# Patient Record
Sex: MALE | Race: WHITE | Employment: FULL TIME | URBAN - METROPOLITAN AREA
[De-identification: names, ages, dates, MRNs, and addresses within clinical notes are randomized per-mention and may not be internally consistent; named-entity substitution may affect disease eponyms.]

---

## 2017-10-27 ENCOUNTER — HOSPITAL ENCOUNTER (OUTPATIENT)
Facility: CLINIC | Age: 47
Setting detail: OBSERVATION
Discharge: HOME | End: 2017-10-28
Attending: EMERGENCY MEDICINE | Admitting: INTERNAL MEDICINE
Payer: COMMERCIAL

## 2017-10-27 ENCOUNTER — APPOINTMENT (OUTPATIENT)
Dept: CT IMAGING | Facility: CLINIC | Age: 47
End: 2017-10-27
Attending: EMERGENCY MEDICINE
Payer: COMMERCIAL

## 2017-10-27 ENCOUNTER — APPOINTMENT (OUTPATIENT)
Dept: GENERAL RADIOLOGY | Facility: CLINIC | Age: 47
End: 2017-10-27
Attending: EMERGENCY MEDICINE
Payer: COMMERCIAL

## 2017-10-27 DIAGNOSIS — J18.9 PNEUMONIA OF RIGHT LOWER LOBE DUE TO INFECTIOUS ORGANISM: ICD-10-CM

## 2017-10-27 DIAGNOSIS — K59.03 DRUG-INDUCED CONSTIPATION: Primary | ICD-10-CM

## 2017-10-27 DIAGNOSIS — S01.01XA LACERATION OF SCALP, INITIAL ENCOUNTER: ICD-10-CM

## 2017-10-27 DIAGNOSIS — S22.41XA CLOSED FRACTURE OF MULTIPLE RIBS OF RIGHT SIDE, INITIAL ENCOUNTER: ICD-10-CM

## 2017-10-27 LAB
ALBUMIN SERPL-MCNC: 3.3 G/DL (ref 3.4–5)
ALP SERPL-CCNC: 107 U/L (ref 40–150)
ALT SERPL W P-5'-P-CCNC: 18 U/L (ref 0–70)
ANION GAP SERPL CALCULATED.3IONS-SCNC: 5 MMOL/L (ref 3–14)
AST SERPL W P-5'-P-CCNC: 13 U/L (ref 0–45)
BASOPHILS # BLD AUTO: 0.1 10E9/L (ref 0–0.2)
BASOPHILS NFR BLD AUTO: 0.5 %
BILIRUB SERPL-MCNC: 0.5 MG/DL (ref 0.2–1.3)
BUN SERPL-MCNC: 13 MG/DL (ref 7–30)
CALCIUM SERPL-MCNC: 8.7 MG/DL (ref 8.5–10.1)
CHLORIDE SERPL-SCNC: 98 MMOL/L (ref 94–109)
CO2 BLDCOV-SCNC: 32 MMOL/L (ref 21–28)
CO2 SERPL-SCNC: 32 MMOL/L (ref 20–32)
CREAT SERPL-MCNC: 0.59 MG/DL (ref 0.66–1.25)
DIFFERENTIAL METHOD BLD: ABNORMAL
EOSINOPHIL # BLD AUTO: 0.1 10E9/L (ref 0–0.7)
EOSINOPHIL NFR BLD AUTO: 0.7 %
ERYTHROCYTE [DISTWIDTH] IN BLOOD BY AUTOMATED COUNT: 11.9 % (ref 10–15)
ETHANOL SERPL-MCNC: 0.18 G/DL
GFR SERPL CREATININE-BSD FRML MDRD: >90 ML/MIN/1.7M2
GLUCOSE SERPL-MCNC: 99 MG/DL (ref 70–99)
HCT VFR BLD AUTO: 41.7 % (ref 40–53)
HGB BLD-MCNC: 14.2 G/DL (ref 13.3–17.7)
IMM GRANULOCYTES # BLD: 0.1 10E9/L (ref 0–0.4)
IMM GRANULOCYTES NFR BLD: 0.9 %
LACTATE BLD-SCNC: 0.8 MMOL/L (ref 0.7–2)
LACTATE BLD-SCNC: 2.2 MMOL/L (ref 0.7–2.1)
LYMPHOCYTES # BLD AUTO: 2 10E9/L (ref 0.8–5.3)
LYMPHOCYTES NFR BLD AUTO: 13.2 %
MCH RBC QN AUTO: 36 PG (ref 26.5–33)
MCHC RBC AUTO-ENTMCNC: 34.1 G/DL (ref 31.5–36.5)
MCV RBC AUTO: 106 FL (ref 78–100)
MONOCYTES # BLD AUTO: 1.1 10E9/L (ref 0–1.3)
MONOCYTES NFR BLD AUTO: 7.5 %
NEUTROPHILS # BLD AUTO: 11.7 10E9/L (ref 1.6–8.3)
NEUTROPHILS NFR BLD AUTO: 77.2 %
NRBC # BLD AUTO: 0 10*3/UL
NRBC BLD AUTO-RTO: 0 /100
PCO2 BLDV: 49 MM HG (ref 40–50)
PH BLDV: 7.42 PH (ref 7.32–7.43)
PLATELET # BLD AUTO: 305 10E9/L (ref 150–450)
PO2 BLDV: 22 MM HG (ref 25–47)
POTASSIUM SERPL-SCNC: 4 MMOL/L (ref 3.4–5.3)
PROT SERPL-MCNC: 8.1 G/DL (ref 6.8–8.8)
RBC # BLD AUTO: 3.94 10E12/L (ref 4.4–5.9)
SAO2 % BLDV FROM PO2: 37 %
SODIUM SERPL-SCNC: 135 MMOL/L (ref 133–144)
WBC # BLD AUTO: 15.2 10E9/L (ref 4–11)

## 2017-10-27 PROCEDURE — G0378 HOSPITAL OBSERVATION PER HR: HCPCS

## 2017-10-27 PROCEDURE — 96365 THER/PROPH/DIAG IV INF INIT: CPT

## 2017-10-27 PROCEDURE — 25000132 ZZH RX MED GY IP 250 OP 250 PS 637: Performed by: INTERNAL MEDICINE

## 2017-10-27 PROCEDURE — 87040 BLOOD CULTURE FOR BACTERIA: CPT | Performed by: EMERGENCY MEDICINE

## 2017-10-27 PROCEDURE — 99285 EMERGENCY DEPT VISIT HI MDM: CPT | Mod: 25

## 2017-10-27 PROCEDURE — 71010 XR CHEST 1 VW: CPT

## 2017-10-27 PROCEDURE — 83605 ASSAY OF LACTIC ACID: CPT | Mod: 91 | Performed by: INTERNAL MEDICINE

## 2017-10-27 PROCEDURE — 25000132 ZZH RX MED GY IP 250 OP 250 PS 637: Performed by: EMERGENCY MEDICINE

## 2017-10-27 PROCEDURE — 25000128 H RX IP 250 OP 636: Performed by: EMERGENCY MEDICINE

## 2017-10-27 PROCEDURE — 71260 CT THORAX DX C+: CPT

## 2017-10-27 PROCEDURE — 96367 TX/PROPH/DG ADDL SEQ IV INF: CPT

## 2017-10-27 PROCEDURE — 80053 COMPREHEN METABOLIC PANEL: CPT | Performed by: EMERGENCY MEDICINE

## 2017-10-27 PROCEDURE — 99207 ZZC CDG-MDM COMPONENT: MEETS LOW - DOWN CODED: CPT | Performed by: INTERNAL MEDICINE

## 2017-10-27 PROCEDURE — 83605 ASSAY OF LACTIC ACID: CPT

## 2017-10-27 PROCEDURE — 82803 BLOOD GASES ANY COMBINATION: CPT

## 2017-10-27 PROCEDURE — 85025 COMPLETE CBC W/AUTO DIFF WBC: CPT | Performed by: EMERGENCY MEDICINE

## 2017-10-27 PROCEDURE — 36415 COLL VENOUS BLD VENIPUNCTURE: CPT

## 2017-10-27 PROCEDURE — 36415 COLL VENOUS BLD VENIPUNCTURE: CPT | Performed by: INTERNAL MEDICINE

## 2017-10-27 PROCEDURE — 80320 DRUG SCREEN QUANTALCOHOLS: CPT | Performed by: EMERGENCY MEDICINE

## 2017-10-27 PROCEDURE — 99219 ZZC INITIAL OBSERVATION CARE,LEVL II: CPT | Performed by: INTERNAL MEDICINE

## 2017-10-27 PROCEDURE — 99219 ZZC INITIAL OBSERVATION CARE,LEVL II: CPT | Performed by: SURGERY

## 2017-10-27 PROCEDURE — 70450 CT HEAD/BRAIN W/O DYE: CPT

## 2017-10-27 RX ORDER — SODIUM CHLORIDE 9 MG/ML
1000 INJECTION, SOLUTION INTRAVENOUS CONTINUOUS
Status: DISCONTINUED | OUTPATIENT
Start: 2017-10-27 | End: 2017-10-28 | Stop reason: HOSPADM

## 2017-10-27 RX ORDER — ONDANSETRON 2 MG/ML
4 INJECTION INTRAMUSCULAR; INTRAVENOUS EVERY 6 HOURS PRN
Status: DISCONTINUED | OUTPATIENT
Start: 2017-10-27 | End: 2017-10-28 | Stop reason: HOSPADM

## 2017-10-27 RX ORDER — NICOTINE 21 MG/24HR
1 PATCH, TRANSDERMAL 24 HOURS TRANSDERMAL DAILY
Status: DISCONTINUED | OUTPATIENT
Start: 2017-10-27 | End: 2017-10-28 | Stop reason: HOSPADM

## 2017-10-27 RX ORDER — MORPHINE SULFATE 4 MG/ML
4 INJECTION, SOLUTION INTRAMUSCULAR; INTRAVENOUS
Status: DISCONTINUED | OUTPATIENT
Start: 2017-10-27 | End: 2017-10-27

## 2017-10-27 RX ORDER — OXYCODONE HYDROCHLORIDE 5 MG/1
5-10 TABLET ORAL
Status: DISCONTINUED | OUTPATIENT
Start: 2017-10-27 | End: 2017-10-28 | Stop reason: HOSPADM

## 2017-10-27 RX ORDER — AMOXICILLIN 250 MG
1-2 CAPSULE ORAL 2 TIMES DAILY PRN
Status: DISCONTINUED | OUTPATIENT
Start: 2017-10-27 | End: 2017-10-28 | Stop reason: HOSPADM

## 2017-10-27 RX ORDER — LORAZEPAM 1 MG/1
1-2 TABLET ORAL EVERY 30 MIN PRN
Status: DISCONTINUED | OUTPATIENT
Start: 2017-10-27 | End: 2017-10-28 | Stop reason: HOSPADM

## 2017-10-27 RX ORDER — IBUPROFEN 600 MG/1
600 TABLET, FILM COATED ORAL EVERY 6 HOURS PRN
Status: DISCONTINUED | OUTPATIENT
Start: 2017-10-27 | End: 2017-10-28 | Stop reason: HOSPADM

## 2017-10-27 RX ORDER — AZITHROMYCIN 250 MG/1
250 TABLET, FILM COATED ORAL DAILY
Status: DISCONTINUED | OUTPATIENT
Start: 2017-10-28 | End: 2017-10-28 | Stop reason: HOSPADM

## 2017-10-27 RX ORDER — CEFTRIAXONE SODIUM 1 G/50ML
1 INJECTION, SOLUTION INTRAVENOUS EVERY 24 HOURS
Status: DISCONTINUED | OUTPATIENT
Start: 2017-10-28 | End: 2017-10-28

## 2017-10-27 RX ORDER — MULTIPLE VITAMINS W/ MINERALS TAB 9MG-400MCG
1 TAB ORAL DAILY
Status: DISCONTINUED | OUTPATIENT
Start: 2017-10-27 | End: 2017-10-28 | Stop reason: HOSPADM

## 2017-10-27 RX ORDER — IOPAMIDOL 755 MG/ML
500 INJECTION, SOLUTION INTRAVASCULAR ONCE
Status: COMPLETED | OUTPATIENT
Start: 2017-10-27 | End: 2017-10-27

## 2017-10-27 RX ORDER — LORAZEPAM 2 MG/ML
1-2 INJECTION INTRAMUSCULAR EVERY 30 MIN PRN
Status: DISCONTINUED | OUTPATIENT
Start: 2017-10-27 | End: 2017-10-28 | Stop reason: HOSPADM

## 2017-10-27 RX ORDER — NALOXONE HYDROCHLORIDE 0.4 MG/ML
.1-.4 INJECTION, SOLUTION INTRAMUSCULAR; INTRAVENOUS; SUBCUTANEOUS
Status: DISCONTINUED | OUTPATIENT
Start: 2017-10-27 | End: 2017-10-28 | Stop reason: HOSPADM

## 2017-10-27 RX ORDER — FOLIC ACID 1 MG/1
1 TABLET ORAL DAILY
Status: DISCONTINUED | OUTPATIENT
Start: 2017-10-27 | End: 2017-10-28 | Stop reason: HOSPADM

## 2017-10-27 RX ORDER — ONDANSETRON 4 MG/1
4 TABLET, ORALLY DISINTEGRATING ORAL EVERY 6 HOURS PRN
Status: DISCONTINUED | OUTPATIENT
Start: 2017-10-27 | End: 2017-10-28 | Stop reason: HOSPADM

## 2017-10-27 RX ORDER — ACETAMINOPHEN 500 MG
500 TABLET ORAL EVERY 4 HOURS PRN
Status: DISCONTINUED | OUTPATIENT
Start: 2017-10-27 | End: 2017-10-28 | Stop reason: HOSPADM

## 2017-10-27 RX ORDER — CEFTRIAXONE 1 G/1
1 INJECTION, POWDER, FOR SOLUTION INTRAMUSCULAR; INTRAVENOUS ONCE
Status: COMPLETED | OUTPATIENT
Start: 2017-10-27 | End: 2017-10-27

## 2017-10-27 RX ORDER — LANOLIN ALCOHOL/MO/W.PET/CERES
100 CREAM (GRAM) TOPICAL DAILY
Status: DISCONTINUED | OUTPATIENT
Start: 2017-10-27 | End: 2017-10-28 | Stop reason: HOSPADM

## 2017-10-27 RX ADMIN — Medication 100 MG: at 18:35

## 2017-10-27 RX ADMIN — SODIUM CHLORIDE 1000 ML: 9 INJECTION, SOLUTION INTRAVENOUS at 15:39

## 2017-10-27 RX ADMIN — NICOTINE 1 PATCH: 21 PATCH, EXTENDED RELEASE TRANSDERMAL at 18:35

## 2017-10-27 RX ADMIN — IOPAMIDOL 80 ML: 755 INJECTION, SOLUTION INTRAVENOUS at 14:33

## 2017-10-27 RX ADMIN — IBUPROFEN 600 MG: 600 TABLET ORAL at 20:44

## 2017-10-27 RX ADMIN — Medication 1 MG: at 20:44

## 2017-10-27 RX ADMIN — AZITHROMYCIN MONOHYDRATE 500 MG: 500 INJECTION, POWDER, LYOPHILIZED, FOR SOLUTION INTRAVENOUS at 16:17

## 2017-10-27 RX ADMIN — CEFTRIAXONE 1 G: 1 INJECTION, POWDER, FOR SOLUTION INTRAMUSCULAR; INTRAVENOUS at 15:37

## 2017-10-27 RX ADMIN — ACETAMINOPHEN 500 MG: 500 TABLET, FILM COATED ORAL at 14:18

## 2017-10-27 RX ADMIN — SODIUM CHLORIDE 1000 ML: 9 INJECTION, SOLUTION INTRAVENOUS at 16:18

## 2017-10-27 RX ADMIN — MULTIPLE VITAMINS W/ MINERALS TAB 1 TABLET: TAB at 18:35

## 2017-10-27 RX ADMIN — SODIUM CHLORIDE 60 ML: 9 INJECTION, SOLUTION INTRAVENOUS at 14:33

## 2017-10-27 RX ADMIN — FOLIC ACID 1 MG: 1 TABLET ORAL at 18:35

## 2017-10-27 ASSESSMENT — ENCOUNTER SYMPTOMS
ABDOMINAL PAIN: 0
WOUND: 1
COUGH: 0
NAUSEA: 0
BACK PAIN: 1
PHOTOPHOBIA: 0
NECK STIFFNESS: 0
SHORTNESS OF BREATH: 1
COLOR CHANGE: 0
DIFFICULTY URINATING: 0
HEADACHES: 1
DYSURIA: 0
FEVER: 0
PALPITATIONS: 0
NECK PAIN: 0
DIARRHEA: 0
NUMBNESS: 0
SPEECH DIFFICULTY: 0
MYALGIAS: 0
LIGHT-HEADEDNESS: 0
WEAKNESS: 0
ARTHRALGIAS: 0
VOMITING: 0
DIZZINESS: 0
FLANK PAIN: 1

## 2017-10-27 NOTE — IP AVS SNAPSHOT
MRN:[id number]                      After Visit Summary   10/27/2017    Luis Carlos Nash    MRN: [id number]           Thank you!     Thank you for choosing [facility] for your care. Our goal is always to provide you with excellent care. Hearing back from our patients is one way we can continue to improve our services. Please take a few minutes to complete the written survey that you may receive in the mail after you visit. If you would like to speak to someone directly about your visit please contact Patient Relations at [phone]. Thank you!          Patient Information     Date Of Birth          1970        Designated Caregiver       Most Recent Value    Caregiver    Will someone help with your care after discharge? yes    Name of designated caregiver Hector Nash    Phone number of caregiver [phone]      About your hospital stay     You were admitted on:  October 27, 2017 You last received care in the:  [place] [address] [facility]    You were discharged on:  October 28, 2017        Reason for your hospital stay       You were admitted for a fall with rib fractures (6 days prior to admission) found to have a pneumonia.  You have been treated with antibiotics.  Moving forward, you should continue to practice deep breathing and use the incentive spirometry machine.                  Who to Call     For medical emergencies, please call 911.  For non-urgent questions about your medical care, please call your primary care provider or clinic, [phone]          Attending Provider     Provider Specialty    Esperanza Payan MD Emergency Medicine    [facility]       Primary Care Provider Office Phone # Fax #    [facility] [phone]      After Care Instructions     Activity       Your activity upon discharge: gradually resume light activity as tolerated but do not drive if you have  "been drinking alcohol.            Diet       Follow this diet upon discharge: Orders Placed This Encounter      Regular Diet Adult                  Follow-up Appointments     Follow-up and recommended labs and tests        Follow up with primary care provider, [facility], within 5-7 days for hospital follow- up.                  Pending Results     Date and Time Order Name Status Description    10/27/2017 1533 Blood culture Preliminary     10/27/2017 1533 Blood culture Preliminary             Statement of Approval     Ordered          10/28/17 1142  I have reviewed and agree with all the recommendations and orders detailed in this document.  EFFECTIVE NOW     Approved and electronically signed by:  Nishant Garcia MD             Admission Information     Date & Time Provider Department Dept. Phone    10/27/2017 Nishant Garica MD [place] [address] [facility] [phone]      Your Vitals Were     Blood Pressure Pulse Temperature Respirations Height Weight    137/86 (BP Location: Right arm) 76 97.6  F (36.4  C) (Oral) 16 1.778 m (5' 10\") 73.9 kg (162 lb 14.4 oz)    Pulse Oximetry BMI (Body Mass Index)                95% 23.37 kg/m2          [facility] Information     [employer] lets you send messages to your doctor, view your test results, renew your prescriptions, schedule appointments and more. To sign up, go to [email or web address] . Click on \"Log in\" on the left side of the screen, which will take you to the Welcome page. Then click on \"Sign up Now\" on the right side of the page.     You will be asked to enter the access code listed below, as well as some personal information. Please follow the directions to create your username and password.     Your access code is: [email or web address]  Expires: 2018 12:49 PM     Your access code will  in 90 days. If you need help or a new code, please call your [facility] or [phone].        Care EveryWhere ID  "    This is your Care EveryWhere ID. This could be used by other organizations to access your [facility] medical records  [id number]        Equal Access to Services     JAZZ FLORES : Patricia Snyder, adrien ríos, claudette duncanmagume millan, alphonso grimesin hayaamurphy dunneronald silva lalaurenmurphy soniGavino So [facility] [phone].    ATENCIÓN: Si habla español, tiene a flores disposición servicios gratuitos de asistencia lingüística. Llame al [phone].    We comply with applicable federal civil rights laws and [place] laws. We do not discriminate on the basis of race, color, national origin, age, disability, sex, sexual orientation, or gender identity.               Review of your medicines      START taking        Dose / Directions    acetaminophen 500 MG tablet   Commonly known as:  TYLENOL   Used for:  Closed fracture of multiple ribs of right side, initial encounter        Dose:  500 mg   Take 1 tablet (500 mg) by mouth every 4 hours as needed for mild pain or fever   Refills:  0       azithromycin 250 MG tablet   Commonly known as:  ZITHROMAX   Indication:  Community Acquired Pneumonia   Used for:  Pneumonia of right lower lobe due to infectious organism (H)        Dose:  250 mg   Start taking on:  10/29/2017   Take 1 tablet (250 mg) by mouth daily for 3 days   Quantity:  3 tablet   Refills:  0       cefuroxime 500 MG tablet   Commonly known as:  CEFTIN   Used for:  Pneumonia of right lower lobe due to infectious organism (H)        Dose:  500 mg   Start taking on:  10/29/2017   Take 1 tablet (500 mg) by mouth 2 times daily for 5 days   Quantity:  10 tablet   Refills:  0       senna-docusate 8.6-50 MG per tablet   Commonly known as:  SENOKOT-S;PERICOLACE   Used for:  Drug-induced constipation        Dose:  1-2 tablet   Take 1-2 tablets by mouth 2 times daily as needed (constipation )   Quantity:  40 tablet   Refills:  0            Where to get your medicines      These medications were sent to [facility]  [facility] [address] [address]  [address] [address] [address]     Phone:  [phone]     azithromycin 250 MG tablet    cefuroxime 500 MG tablet    senna-docusate 8.6-50 MG per tablet         Some of these will need a paper prescription and others can be bought over the counter. Ask your nurse if you have questions.     You don't need a prescription for these medications     acetaminophen 500 MG tablet               ANTIBIOTIC INSTRUCTION     You've Been Prescribed an Antibiotic - Now What?  Your healthcare team thinks that you or your loved one might have an infection. Some infections can be treated with antibiotics, which are powerful, life-saving drugs. Like all medications, antibiotics have side effects and should only be used when necessary. There are some important things you should know about your antibiotic treatment.      Your healthcare team may run tests before you start taking an antibiotic.    Your team may take samples (e.g., from your blood, urine or other areas) to run tests to look for bacteria. These test can be important to determine if you need an antibiotic at all and, if you do, which antibiotic will work best.      Within a few days, your healthcare team might change or even stop your antibiotic.    Your team may start you on an antibiotic while they are working to find out what is making you sick.    Your team might change your antibiotic because test results show that a different antibiotic would be better to treat your infection.    In some cases, once your team has more information, they learn that you do not need an antibiotic at all. They may find out that you don't have an infection, or that the antibiotic you're taking won't work against your infection. For example, an infection caused by a virus can't be treated with antibiotics. Staying on an antibiotic when you don't need it is more likely to be harmful than helpful.      You may experience side  effects from your antibiotic.    Like all medications, antibiotics have side effects. Some of these can be serious.    Let you healthcare team know if you have any known allergies when you are admitted to the hospital.    One significant side effect of nearly all antibiotics is the risk of severe and sometimes deadly diarrhea caused by Clostridium difficile (C. Difficile). This occurs when a person takes antibiotics because some good germs are destroyed. Antibiotic use allows C. diificile to take over, putting patients at high risk for this serious infection.    As a patient or caregiver, it is important to understand your or your loved one's antibiotic treatment. It is especially important for caregivers to speak up when patients can't speak for themselves. Here are some important questions to ask your healthcare team.    What infection is this antibiotic treating and how do you know I have that infection?    What side effects might occur from this antibiotic?    How long will I need to take this antibiotic?    Is it safe to take this antibiotic with other medications or supplements (e.g., vitamins) that I am taking?     Are there any special directions I need to know about taking this antibiotic? For example, should I take it with food?    How will I be monitored to know whether my infection is responding to the antibiotic?    What tests may help to make sure the right antibiotic is prescribed for me?      Information provided by:  www.cdc.gov/getsmart  U.S. Department of Health and Human Services  Centers for disease Control and Prevention  National Center for Emerging and Zoonotic Infectious Diseases  Division of Healthcare Quality Promotion         Protect others around you: Learn how to safely use, store and throw away your medicines at www.disposemymeds.org.             Medication List: This is a list of all your medications and when to take them. Check marks below indicate your daily home schedule. Keep this  list as a reference.      Medications           Morning Afternoon Evening Bedtime As Needed    acetaminophen 500 MG tablet   Commonly known as:  TYLENOL   Take 1 tablet (500 mg) by mouth every 4 hours as needed for mild pain or fever   Last time this was given:  500 mg on 10/27/2017  2:18 PM                                azithromycin 250 MG tablet   Commonly known as:  ZITHROMAX   Take 1 tablet (250 mg) by mouth daily for 3 days   Start taking on:  10/29/2017   Last time this was given:  250 mg on 10/28/2017  8:31 AM                                cefuroxime 500 MG tablet   Commonly known as:  CEFTIN   Take 1 tablet (500 mg) by mouth 2 times daily for 5 days   Start taking on:  10/29/2017                                senna-docusate 8.6-50 MG per tablet   Commonly known as:  SENOKOT-S;PERICOLACE   Take 1-2 tablets by mouth 2 times daily as needed (constipation )

## 2017-10-27 NOTE — ED NOTES
Patient had a fall on Saturday in the bath tub. Uncertain of the details following that event. Notes he probably had a LOC as he did strike his head. Also right sided pain in his rib cage. Unclosed 5cm laceration on the back of his head.    Denies back pain, arm or leg pain. Denies abdominal pain.

## 2017-10-27 NOTE — ED NOTES
[facility]  ED Nurse Handoff Report    Luis Carlos Nash is a 46 year old male   ED Chief complaint: Fall  . ED Diagnosis:   Final diagnoses:   Closed fracture of multiple ribs of right side, initial encounter   Pneumonia of right lower lobe due to infectious organism (H)   Laceration of scalp, initial encounter     Allergies: No Known Allergies    Code Status: Full Code  Activity level - Baseline/Home:  Independent. Activity Level - Current:   Stand with Assist. Lift room needed: No. Bariatric: No   Needed: No   Isolation: No. Infection: Not Applicable.     Vital Signs:   Vitals:    10/27/17 1500 10/27/17 1501 10/27/17 1503 10/27/17 1504   BP:  112/72     Resp:       Temp:       TempSrc:       SpO2:   94% 94%   Weight: 77.1 kg (170 lb)          Cardiac Rhythm:  ,      Pain level:    Patient confused: No- Blood alcohol 0.18- does take some repeating to understand some things.. Patient Falls Risk: Yes.   Elimination Status: Hasn't voided in ER   Patient Report - Initial Complaint: Fall. Focused Assessment:    13:42 Cardiac Cardiac - Cardiac WDL:  WDL except; all  Review of Systems (Cardiac) - Cardiac Signs/Symptoms: other (see comments) (Tachycardia)  Cardiac Monitoring - EKG Monitoring: Yes Cardiac Regularity: Regular Cardiac Rhythm: ST AB    13:42 Neurological Cognitive/Perceptual/Neuro - Cognitive/Neuro/Behavioral WDL:  WDL except; all Mood/Behavior: cooperative; other (see comments) (ETOH) Headache: Other (comment) Memory Deficit: forgetful  Pupils (CN II) - Pupil PERRLA: yes AB    13:42 Musculoskeletal Musculoskeletal - Musculoskeletal WDL:  WDL except General Mobility: generalized weakness Side: Right Pain Body Location: chest, general (Fell- broken ribs) CMS Intact: Yes AB    13:42 Respiratory Respiratory - Respiratory WDL:  WDL except; all Rhythm/Pattern, Respiratory: shallow; shortness of breath reported; dyspnea on exertion; other (see comments) (Pain with deep breath) Lung  [place]: RLL RLL Breath Sounds: crackles; crackles coarse; coarse AB    13:42 Skin Color/Condition Skin - Skin WDL:  WDL except; all Skin Integrity: wound(s) (Laceration about 5cm back of head)         Tests Performed: Labs, CT, CXR, BC Abnormal Results:   Labs Ordered and Resulted from Time of ED Arrival Up to the Time of Departure from the ED   CBC WITH PLATELETS DIFFERENTIAL - Abnormal; Notable for the following:        Result Value    WBC 15.2 (*)     RBC Count 3.94 (*)      (*)     MCH 36.0 (*)     Absolute Neutrophil 11.7 (*)     All other components within normal limits   COMPREHENSIVE METABOLIC PANEL - Abnormal; Notable for the following:     Creatinine 0.59 (*)     Albumin 3.3 (*)     All other components within normal limits   ALCOHOL ETHYL - Abnormal; Notable for the following:     Ethanol g/dL 0.18 (*)     All other components within normal limits   ISTAT  GASES LACTATE TABATHA POCT - Abnormal; Notable for the following:     PO2 Venous 22 (*)     Bicarbonate Venous 32 (*)     Lactic Acid 2.2 (*)     All other components within normal limits   ISTAT CG4 GASES LACTATE TABATHA NURSING POCT   BLOOD CULTURE   BLOOD CULTURE   IMPRESSION:  1. Patchy abnormal airspace opacities in both lungs, most  predominantly in the right lower lobe are compatible with pneumonia.  2. Nondisplaced and minimally displaced right-sided rib fractures.  3. Trace right pleural effusion.  .   Treatments provided: Fluids, Abx  Family Comments: Lac on back of head from previous fall, about 5cm. Broken ribs R side.  OBS brochure/video discussed/provided to patient:  Yes  ED Medications:   Medications   acetaminophen (TYLENOL) tablet 500 mg (500 mg Oral Given 10/27/17 1418)   cefTRIAXone (ROCEPHIN) 1 g vial to attach to  mL bag for ADULTS or NS 50 mL bag for PEDS (1 g Intravenous New Bag 10/27/17 1537)   azithromycin (ZITHROMAX) 500 mg in NaCl 0.9 % 250 mL intermittent infusion (not administered)   0.9% sodium chloride BOLUS (1,000  mLs Intravenous New Bag 10/27/17 1539)     Followed by   0.9% sodium chloride infusion (not administered)   0.9% sodium chloride BOLUS (0 mLs Intravenous Stopped 10/27/17 1434)   iopamidol (ISOVUE-370) solution 500 mL (80 mLs Intravenous Given 10/27/17 1433)     Drips infusing:  Yes- abx  For the majority of the shift, the patient's behavior Green. Interventions performed were .     Severe Sepsis OR Septic Shock Diagnosis Present: No      ED Nurse Name/Phone Number: Rosa Barrios,   3:56 PM    RECEIVING UNIT ED HANDOFF REVIEW    Above ED Nurse Handoff Report was reviewed: Yes  Reviewed by: Shyanne Muller on October 27, 2017 at 5:24 PM

## 2017-10-27 NOTE — ED PROVIDER NOTES
History     Chief Complaint:  Fall    HPI   Luis Carlos Nash is a 46 year old male who presents with a fall. The patient reports that he suffered a fall in his bathtub after an evening of drinking approximately 6 days ago. He states that he does not remember the incident but does not believe he lost consciousness. He does not remember the events leading up to his fall either. He reports that since then he has been having right sided chest/rib pain that worsenes with breathing and movement, along with suffering a large linear laceration to his occipital scalp. He endorses productive cough with sputum.  He states he has been able to ambulate slowly due to the pain and also reports that his pain increases with deep breathing. He denies any other symptoms or injuries and states he believes he is up to date on his Tetanus.    Allergies:  No known drug allergies.    Medications:    The patient is currently on no regular medications.     Past Medical History:    No significant past medical history.     Past Surgical History:    No pertinent past surgical history.    Family History:    No pertinent family history.    Social History:  Smoking status: Never smoker  Alcohol use: Yes  Marital Status:  Single      Review of Systems   Constitutional: Negative for fever.   Eyes: Negative for photophobia and visual disturbance.   Respiratory: Positive for shortness of breath. Negative for cough.    Cardiovascular: Positive for chest pain. Negative for palpitations and leg swelling.   Gastrointestinal: Negative for abdominal pain, diarrhea, nausea and vomiting.   Genitourinary: Positive for flank pain. Negative for difficulty urinating and dysuria.   Musculoskeletal: Positive for back pain and gait problem. Negative for arthralgias, myalgias, neck pain and neck stiffness.   Skin: Positive for wound. Negative for color change.   Neurological: Positive for syncope and headaches. Negative for dizziness, speech difficulty,  weakness, light-headedness and numbness.   All other systems reviewed and are negative.      Physical Exam     Patient Vitals for the past 24 hrs:   BP Temp Temp src Heart Rate Resp SpO2 Weight   10/27/17 1500 - - - - - - 77.1 kg (170 lb)   10/27/17 1336 - 98.6  F (37  C) Oral - - - -   10/27/17 1334 125/89 - - 104 18 96 % -         Physical Exam  Constitutional: Alert, attentive, GCS 15. In moderate distress sitting up in bed.   HENT:   Head: scalp laceration as described below   Nose: Nose normal.    Mouth/Throat: Oropharynx is clear, mucous membranes are moist   Eyes: Normal conjunctiva. Pupils are equal, round, and reactive to light.   CV: regular rate and rhythm; no murmurs, rubs or gallups. Lungs clear to ausculation.  Chest: Effort normal and breath sounds normal. Diffusely tender to palpation in right posterior chest with rib tenderness. No crepitous.  GI:  There is no tenderness. No distension. Normal bowel sounds  MSK: Normal range of motion.   BACK: No C,T, or L spine tenderness in midline to palpation  Neurological: Alert, attentive  Skin: Skin is warm and dry. 5cm laceration starting to heal on occipital scalp    Emergency Department Course   Imaging:  Chest CT with IV Contrast: IMPRESSION:  1. Patchy abnormal airspace opacities in both lungs, most  predominantly in the right lower lobe are compatible with pneumonia.  2. Nondisplaced and minimally displaced right-sided rib fractures.  3. Trace right pleural effusion.  Report per radiology.      Head CT without contrast:  IMPRESSION:   Normal CT scan of the head.  Report per radiology.      XR Chest 2 views  IMPRESSION: Right basilar atelectasis and mildly displaced right-sided  rib fractures again seen, better evaluated on CT earlier today. Left  lung is clear. No seen on either side.  Report per radiology.    Laboratory:  CBC:  WBC 15.2 (H), HGB 14.2, ,  CMP: Creatinine 0.59 (L), Albumin 3.3 (L),  otherwise WNL  (1344) DEJON: 0.18 (H)      Interventions:  (1418) Tylenol, 500 mg, PO  (1418) Normal Saline, 1 liter, IV bolus  (1540) Ceftriaxone, 1 g, IV infusion  (1540) Azithromycin 500 mg, IV Infusion    Emergency Department Course:  Nursing notes and vitals reviewed.  (1331) I performed an exam of the patient as documented above.    The patient was sent for a CT scan and x-ray while in the emergency department, findings above.      (1525) I spoke with Dr. Alva of general surgery about the patient and his condition.    Findings and plan explained to the patient who consents to admission.   (1535) I discussed the patient with Dr. Soriano of the hospitalist service, who will admit the patient to a med/surg bed for further monitoring, evaluation, and treatment.   Impression & Plan      CMS Diagnoses:   The patient has signs of Severe Sepsis as evidenced by:    1. 2 SIRS criteria, AND  2. Suspected infection, AND   3. Organ dysfunction: Lactic Acid >2    Time severe sepsis diagnosis confirmed = 1526 as this was the time when Lactate resulted, and the level was >2      3 Hour Severe Sepsis Bundle Completion:  1. Initial Lactic Acid Result:   Recent Labs   Lab Test  10/27/17   1526   LACT  2.2*     2. Blood Cultures before Antibiotics: Yes  3. Broad Spectrum Antibiotics Administered: Yes     Anti-infectives (Future)    Start     Dose/Rate Route Frequency Ordered Stop    10/27/17 1505  azithromycin (ZITHROMAX) 500 mg in NaCl 0.9 % 250 mL intermittent infusion      500 mg  over 1 Hours Intravenous ONCE 10/27/17 1503      10/27/17 1504  cefTRIAXone (ROCEPHIN) 1 g vial to attach to  mL bag for ADULTS or NS 50 mL bag for PEDS      1 g  over 30 Minutes Intravenous ONCE 10/27/17 1503          4. 1,000 ml of IV fluids.    the patient was transferred out of the ED prior to the 6 hour yumi.  and None    Medical Decision Making:  Luis Carlos Nash is a 46 year old male who presents for evaluation after fall while in intoxicated in his bathroom 1  week ago, here with right sided chest/rib pain.  CT chest shows 6 right sided rib fractures with associated infiltrate.  This is concerning for pneumonia development 2/2 atelectasis from his recent trauma, especially given his cough and leukocytosis.  He meets severe sepsis criteria with +SIRs, infection, and lactate of 2.2.  He was given NS bolus and started on ceftriaxone/azithromycin for community acquired pneumonia.  Patient will be admitted to hospitalist for IV antibiotics, incentive spirometry, and pain control.  I also spoke with Dr. Alva in general surgery regarding his recent trauma and multiple rib fractures.  He will consult on the patient.  Patient has healing scalp laceration on occipital area--will let heal by secondary intention as it is 7 days old.  It does not appear infected.  Tetanus is up to date.  I did discuss with hospitalist patient's alcohol abuse history (BAL 0.13 here) and risk for withdrawal.  Patient will be admitted and agreed to above stated plan.  All questions answered.      Diagnosis:    ICD-10-CM   1. Closed fracture of multiple ribs of right side, initial encounter S22.41XA   2. Pneumonia of right lower lobe due to infectious organism (H) J18.1       Disposition:  Patient is admitted to a med/surg bed under the care of Dr. Soriano.        I, Viraj Farrar, am serving as a scribe on 10/27/2017 at 1:31 PM to personally document services performed by Dr. Payan based on my observations and the provider's statements to me.        Viraj Farrar  10/27/2017   [facility] EMERGENCY DEPARTMENT       Esperanza Payan MD  10/27/17 1191

## 2017-10-27 NOTE — PHARMACY-ADMISSION MEDICATION HISTORY
Admission medication history interview status for this patient is complete. See [facility] admission navigator for allergy information, prior to admission medications and immunization status.     Medication history interview source(s):Patient  Medication history resources (including written lists, pill bottles, clinic record):None  Primary pharmacy:None identified    - Patient reports NO medications prior admission.    Prior to Admission medications    Not on File

## 2017-10-27 NOTE — H&P
[facility]  Hospitalist Admission Note  Name: Luis Carlos Nash    MRN: [id number]  YOB: 1970    Age: 46 year old  Date of admission: 10/27/2017  Primary care provider: No primary care provider on file.    Chief Complaint:  Pneumonia, rib fractures    Luis Carlos Nash is a 46 year old male with PMH including etoh abuse who presents with a fall in his bathtub six days ago after an evening of drinking.  He was found to have elevated WBC (15K) with right sided rib fractures and patchy infiltrates.  He is being admitted for suspected pneumonia and pain control.  He denies hx of withdrawal but is at risk for developing this so CIWA has been ordered.    Assessment and Plan:   1. Fall last week while intoxicated, now w/ rib fractures: as above.    --general surgery was contacted from the ER re: traumatic workup  --continue ceftriaxone and azithromycin for pneumonia  --IS  --continue fluids  --consider PT pending his course.    2.   Alcohol intoxication, concern for ongoing alcohol abuse: he denies hx of withdrawal or pancreatitis in the past.  Drinks at least 2-3 drinks per day, sometimes more but doesn't say exactly how much.  --folate/thiamine/mvi   --CIWA w/ prn ativan for possible withdrawal  --fluids    3.   Suspected pneumonia: this is probably secondary to splinting due to rib fractures.  WBC elevated though not hypoxic currently.  Start IS, continue ceftriaxone and azithromycin.    4.   Scalp laceration: six days old, plan from the ER was to have this heal by secondary intent.  Defer to General Surgery re: management.    5.   1 PPD smoker: nicotine patch ordered.    6.  Mild lactate elevation: 2.2.  Recheck after fluids.  Suspect due to hypovolemia, etoh.        DVT Prophylaxis: Pneumatic Compression Devices  Code Status: Full Code  Discharge Dispo: admit to obs status given current stable appearance w/out hypoxia.  If he develops withdrawal, is slow to improve or requires a longer  stay for pain control may need to change to inpatient status.    History of Present Illness:  Luis Carlos Nash is a 46 year old male with PMH including etoh abuse who presents with a fall in his bathtub six days ago after an evening of drinking.  He doesn't remember much about it but he did hit his head and may have lost consciousness.  He has had severe right sided rib pain since then worse with deep breathing and movement.  He did also suffer a large laceration on the occiput of his head.  He has been walking slowly.    Here he had a negative CT head.  CXR showed right basilar atelectasis w/ mildly displaced right sided rib fractures.  CT scan showed patchy airspace opacities in both lungs concerning for pneumonia.  He had non-displaced to minimally displaced fractures on the right on that study as well.    WBC was elevated to 15.2.  DEJON was 0.18.    He does note that he underwent inpatient treatment about a year ago for 28 days.  He does drink every day, typically at least 2-3 drinks.  He denies withdrawal in the past or issues w/ GI bleeding, liver failure or pancreatitis.      He denies other drug use.     Past Medical History:  Other than etoh abuse denies any medical issues (no withdrawal, pancreatitis, bleeding etc).    Past Surgical History:  Denies any surgeries    Social History:  1 ppd smoker  [place]   Daily drinker, at least 2-3 drinks      Family History:  States both parents have no medical issues.    Allergies:  No Known Allergies  Medications:  None         Review of Systems:  A Comprehensive greater than 10 system review of systems was carried out.  Pertinent positives and negatives are noted above.  Otherwise negative for contributory information.     Physical Exam:  Blood pressure 125/89, temperature 98.6  F (37  C), temperature source Oral, resp. rate 18, weight 77.1 kg (170 lb), SpO2 96 %.  Wt Readings from Last 1 Encounters:   10/27/17 77.1 kg (170 lb)      Exam:  General: Alert, awake, no acute distress.  HEENT: NC/AT, eyes anicteric, external occular movements intact, face symmetric.  Dentition WNL, MM moist.  Cardiac: RRR, S1, S2.  No murmurs appreciated.  Pulmonary: bilateral rales.  Otherwise Normal chest rise, normal work of breathing.  Lungs CTA BL.  Chest ttp in right lower lateral area.  Abdomen: soft, non-tender, non-distended.  Bowel Sounds Present.  No guarding.  Extremities: no deformities.  Warm, well perfused.  Skin: no rashes or lesions noted.  Warm and Dry.  Neuro: No focal deficits noted.  Speech clear.  Coordination and strength grossly normal.  Psych: Appropriate affect.    Data:  EKG:  None.  Imaging:  Recent Results (from the past 48 hour(s))   Head CT w/o contrast    Narrative    CT SCAN OF THE HEAD WITHOUT CONTRAST   10/27/2017 2:43 PM     HISTORY: Trauma, history of alcohol abuse, occipital scalp laceration,  evaluate for bleed.    TECHNIQUE:  Axial images of the head and coronal reformations without  IV contrast material.  Radiation dose for this scan was reduced using  automated exposure control, adjustment of the mA and/or kV according  to patient size, or iterative reconstruction technique.    COMPARISON: None.    FINDINGS:  The ventricles are normal in size, shape and configuration.   The brain parenchyma and subarachnoid spaces are normal. There is no  evidence of intracranial hemorrhage, mass, acute infarct or anomaly.     The visualized portions of the sinuses and mastoids appear normal.  There is no evidence of trauma.      Impression    IMPRESSION:   Normal CT scan of the head.   Chest CT w IV contrast only, TRAUMA / DISSECTION    Narrative    CT CHEST WITH CONTRAST   10/27/2017 2:43 PM     HISTORY: Right-sided rib tenderness after fall, evaluate for pneumo,  rib fractures.    TECHNIQUE:  80 mL Isovue-370. CT images of the chest after nonionic  intravenous contrast. Radiation dose for this scan was reduced using  automated  exposure control, adjustment of the mA and/or kV according  to patient size, or iterative reconstruction technique.    COMPARISON: None.    FINDINGS: There is no pneumothorax. There are patchy abnormal  opacities in the right lung, predominantly the right lower lobe. There  is a trace right pleural effusion. There is also minimal patchy  opacity at the base of the lingula and in the medial left lower lobe.  There are nondisplaced and minimally displaced fractures of  right-sided ribs 6, 7, 8, 9, 10, and 11. No left-sided rib fractures.  There is a prominent but not pathologically enlarged precarinal lymph  node. No hilar or axillary adenopathy.      Impression    IMPRESSION:  1. Patchy abnormal airspace opacities in both lungs, most  predominantly in the right lower lobe are compatible with pneumonia.  2. Nondisplaced and minimally displaced right-sided rib fractures.  3. Trace right pleural effusion.    HENRY MARIN MD   XR Chest 1 View    Narrative    XR CHEST 1 VW 10/27/2017 3:03 PM    COMPARISON: CT on the same day.    HISTORY: Fall, right-sided chest pain.      Impression    IMPRESSION: Right basilar atelectasis and mildly displaced right-sided  rib fractures again seen, better evaluated on CT earlier today. Left  lung is clear. No seen on either side.    MEGHAN DOSS MD     Labs:    Recent Labs  Lab 10/27/17  1344   WBC 15.2*   HGB 14.2   HCT 41.7   *             Lab Results   Component Value Date     10/27/2017     04/07/2007     04/05/2007    Lab Results   Component Value Date    CHLORIDE 98 10/27/2017    CHLORIDE 100 04/07/2007    CHLORIDE 96 04/05/2007    Lab Results   Component Value Date    BUN 13 10/27/2017    BUN 9 04/07/2007    BUN 15 04/05/2007      Lab Results   Component Value Date    POTASSIUM 4.0 10/27/2017    POTASSIUM 4.2 04/07/2007    POTASSIUM 3.5 04/05/2007    Lab Results   Component Value Date    CO2 32 10/27/2017    CO2 28 04/07/2007    CO2 29  04/05/2007    Lab Results   Component Value Date    CR 0.59 10/27/2017    CR 0.66 04/07/2007    CR 0.77 04/05/2007          Recent Labs  Lab 10/27/17  1344   AST 13   ALT 18   ALKPHOS 107   BILITOTAL 0.5     No results for input(s): INR in the last 168 hours.    Recent Labs  Lab 10/27/17  1526   LACT 2.2*           Jose Antonio Garcia MD  Hospitalist  [facility]

## 2017-10-27 NOTE — IP AVS SNAPSHOT
[facility] [address] [facility]    [address]    [facility] [address]    Phone:  [phone]    Fax:  [phone]                                       After Visit Summary   10/27/2017    Luis Carlos Nash    MRN: [id number]           After Visit Summary Signature Page     I have received my discharge instructions, and my questions have been answered. I have discussed any challenges I see with this plan with the nurse or doctor.    ..........................................................................................................................................  Patient/Patient Representative Signature      ..........................................................................................................................................  Patient Representative Print Name and Relationship to Patient    ..................................................               ................................................  Date                                            Time    ..........................................................................................................................................  Reviewed by Signature/Title    ...................................................              ..............................................  Date                                                            Time

## 2017-10-27 NOTE — ED NOTES
"Pt refused morphine. Stated \"it's too strong, I don't have a ride and don't need that in my system. If there is a pharmacy here and you can prescribe me pills I can get those and take them at home but it is too strong for here.\" Offered patient something not as strong like tylenol or ibuprofen. Pt asked miligrams of tylenol. When told it could be anywhere from 650 mg to 1000mg based on what the  Orders for tylenol, pt stated \"I don't need anything that strong in my system while I'm out. 500 mg should be fine. If you can write me a prescription I can fill it and take it home.\" Let patient know tylenol was over the counter, but RN would talk with MD.  "

## 2017-10-28 VITALS
HEART RATE: 76 BPM | HEIGHT: 70 IN | BODY MASS INDEX: 23.32 KG/M2 | WEIGHT: 162.9 LBS | DIASTOLIC BLOOD PRESSURE: 86 MMHG | RESPIRATION RATE: 16 BRPM | TEMPERATURE: 97.6 F | OXYGEN SATURATION: 95 % | SYSTOLIC BLOOD PRESSURE: 137 MMHG

## 2017-10-28 LAB
BASOPHILS # BLD AUTO: 0.1 10E9/L (ref 0–0.2)
BASOPHILS NFR BLD AUTO: 0.8 %
DIFFERENTIAL METHOD BLD: ABNORMAL
EOSINOPHIL # BLD AUTO: 0.2 10E9/L (ref 0–0.7)
EOSINOPHIL NFR BLD AUTO: 2.4 %
ERYTHROCYTE [DISTWIDTH] IN BLOOD BY AUTOMATED COUNT: 12.1 % (ref 10–15)
HCT VFR BLD AUTO: 38.1 % (ref 40–53)
HGB BLD-MCNC: 12.9 G/DL (ref 13.3–17.7)
IMM GRANULOCYTES # BLD: 0.1 10E9/L (ref 0–0.4)
IMM GRANULOCYTES NFR BLD: 0.6 %
LYMPHOCYTES # BLD AUTO: 1.6 10E9/L (ref 0.8–5.3)
LYMPHOCYTES NFR BLD AUTO: 16.8 %
MCH RBC QN AUTO: 36.5 PG (ref 26.5–33)
MCHC RBC AUTO-ENTMCNC: 33.9 G/DL (ref 31.5–36.5)
MCV RBC AUTO: 108 FL (ref 78–100)
MONOCYTES # BLD AUTO: 1.1 10E9/L (ref 0–1.3)
MONOCYTES NFR BLD AUTO: 11.7 %
NEUTROPHILS # BLD AUTO: 6.4 10E9/L (ref 1.6–8.3)
NEUTROPHILS NFR BLD AUTO: 67.7 %
NRBC # BLD AUTO: 0 10*3/UL
NRBC BLD AUTO-RTO: 0 /100
PLATELET # BLD AUTO: 289 10E9/L (ref 150–450)
RBC # BLD AUTO: 3.53 10E12/L (ref 4.4–5.9)
WBC # BLD AUTO: 9.4 10E9/L (ref 4–11)

## 2017-10-28 PROCEDURE — 25000128 H RX IP 250 OP 636: Performed by: INTERNAL MEDICINE

## 2017-10-28 PROCEDURE — 96361 HYDRATE IV INFUSION ADD-ON: CPT

## 2017-10-28 PROCEDURE — 36415 COLL VENOUS BLD VENIPUNCTURE: CPT | Performed by: INTERNAL MEDICINE

## 2017-10-28 PROCEDURE — G0378 HOSPITAL OBSERVATION PER HR: HCPCS

## 2017-10-28 PROCEDURE — 99217 ZZC OBSERVATION CARE DISCHARGE: CPT | Performed by: INTERNAL MEDICINE

## 2017-10-28 PROCEDURE — 25000132 ZZH RX MED GY IP 250 OP 250 PS 637: Performed by: INTERNAL MEDICINE

## 2017-10-28 PROCEDURE — 25000128 H RX IP 250 OP 636: Performed by: EMERGENCY MEDICINE

## 2017-10-28 PROCEDURE — 99224 ZZC SUBSEQUENT OBSERVATION CARE,LEVEL I: CPT | Performed by: SURGERY

## 2017-10-28 PROCEDURE — 85025 COMPLETE CBC W/AUTO DIFF WBC: CPT | Performed by: INTERNAL MEDICINE

## 2017-10-28 RX ORDER — CEFUROXIME AXETIL 500 MG/1
500 TABLET ORAL 2 TIMES DAILY
Qty: 10 TABLET | Refills: 0 | Status: SHIPPED | OUTPATIENT
Start: 2017-10-29 | End: 2017-11-03

## 2017-10-28 RX ORDER — ACETAMINOPHEN 500 MG
500 TABLET ORAL EVERY 4 HOURS PRN
Status: ON HOLD
Start: 2017-10-28 | End: 2020-01-01

## 2017-10-28 RX ORDER — AZITHROMYCIN 250 MG/1
250 TABLET, FILM COATED ORAL DAILY
Qty: 3 TABLET | Refills: 0 | Status: SHIPPED | OUTPATIENT
Start: 2017-10-29 | End: 2017-11-01

## 2017-10-28 RX ORDER — CEFTRIAXONE SODIUM 1 G/50ML
1 INJECTION, SOLUTION INTRAVENOUS ONCE
Status: COMPLETED | OUTPATIENT
Start: 2017-10-28 | End: 2017-10-28

## 2017-10-28 RX ORDER — OXYCODONE HYDROCHLORIDE 5 MG/1
5 TABLET ORAL EVERY 4 HOURS PRN
Qty: 20 TABLET | Refills: 0 | Status: SHIPPED | OUTPATIENT
Start: 2017-10-28 | End: 2017-10-28

## 2017-10-28 RX ORDER — AMOXICILLIN 250 MG
1-2 CAPSULE ORAL 2 TIMES DAILY PRN
Qty: 40 TABLET | Refills: 0 | Status: ON HOLD | OUTPATIENT
Start: 2017-10-28 | End: 2020-01-01

## 2017-10-28 RX ADMIN — CEFTRIAXONE SODIUM 1 G: 1 INJECTION, SOLUTION INTRAVENOUS at 12:05

## 2017-10-28 RX ADMIN — SODIUM CHLORIDE 1000 ML: 9 INJECTION, SOLUTION INTRAVENOUS at 00:31

## 2017-10-28 RX ADMIN — Medication 100 MG: at 08:31

## 2017-10-28 RX ADMIN — AZITHROMYCIN 250 MG: 250 TABLET, FILM COATED ORAL at 08:31

## 2017-10-28 RX ADMIN — NICOTINE 1 PATCH: 21 PATCH, EXTENDED RELEASE TRANSDERMAL at 08:32

## 2017-10-28 RX ADMIN — MULTIPLE VITAMINS W/ MINERALS TAB 1 TABLET: TAB at 08:30

## 2017-10-28 RX ADMIN — FOLIC ACID 1 MG: 1 TABLET ORAL at 08:31

## 2017-10-28 RX ADMIN — SODIUM CHLORIDE 1000 ML: 9 INJECTION, SOLUTION INTRAVENOUS at 09:19

## 2017-10-28 NOTE — DISCHARGE SUMMARY
[facility]  Discharge Summary  Name: Luis Carlos Nash    MRN: [id number]  YOB: 1970    Age: 46 year old  Date of Discharge:  10/28/2017  Date of Admission: 10/27/2017  [facility]  Discharge Physician:  Jose Antonio Garcia MD  Discharging Service:  Hospitalist      Hospital Course/Discharge Diagnoses:  Luis Carlos Nash is a 46 year old male with PMH including etoh abuse who presented 10/27/17 with a fall in his bathtub six days ago after an evening of drinking.  He was found to have elevated WBC (15K) with right sided rib fractures and patchy infiltrates though no documented hypoxia or fevers.  He did have some productive cough and felt weak, however.  He was admitted for suspected pneumonia and pain control.  He denies hx of withdrawal and did not display signs of withdrawal here.  His WBC normalized and he felt stronger after ~24 hours of IV antibiotics and fluids.  He is discharging home with instructions to complete 5 more days of ceftin, 3 more days of azithromycin and follow up closely with his PCP.  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    He didn't require much in the way of pain control here for his rib fractures and was managed primarily with tylenol and ibuprofen.     Assessment and Plan:   1. Fall last week while intoxicated, now w/ rib fractures: as above.    --general surgery was contacted from the ER re: traumatic workup  --continue ceftin and azithromycin for pneumonia  --IS at home  --close outpatient follow up     2.   Alcohol intoxication, concern for ongoing alcohol abuse: he denies hx of withdrawal or pancreatitis in the past.  Drinks at least 2-3 drinks per day, sometimes more but doesn't say exactly how much.  --folate/thiamine/mvi while here  --no withdrawal while here.  --counseled on etoh cessation     3.   community acquired pneumonia: this is  "probably secondary to splinting due to rib fractures.  WBC elevated on admit (resolved), not hypoxic currently.  Started IS, continue ceftin and azithromycin.     4.   Scalp laceration: six days old, plan from the ER was to have this heal by secondary intent.  Defer to General Surgery re: management.     5.   1 PPD smoker: nicotine patch ordered.     6.  Mild lactate elevation: 2.2.  Rechecked and normal after fluids.  Suspect due to hypovolemia, etoh.           Discharge Disposition:  Discharged to home     Allergies:  No Known Allergies     Discharge Medications:   Current Discharge Medication List      START taking these medications    Details   senna-docusate (SENOKOT-S;PERICOLACE) 8.6-50 MG per tablet Take 1-2 tablets by mouth 2 times daily as needed (constipation )  Qty: 40 tablet, Refills: 0    Associated Diagnoses: Drug-induced constipation      azithromycin (ZITHROMAX) 250 MG tablet Take 1 tablet (250 mg) by mouth daily for 3 days  Qty: 3 tablet, Refills: 0    Associated Diagnoses: Pneumonia of right lower lobe due to infectious organism (H)      acetaminophen (TYLENOL) 500 MG tablet Take 1 tablet (500 mg) by mouth every 4 hours as needed for mild pain or fever    Associated Diagnoses: Closed fracture of multiple ribs of right side, initial encounter      cefuroxime (CEFTIN) 500 MG tablet Take 1 tablet (500 mg) by mouth 2 times daily for 5 days  Qty: 10 tablet, Refills: 0    Associated Diagnoses: Pneumonia of right lower lobe due to infectious organism (H)              Condition on Discharge:  Discharge condition: Stable   Discharge vitals: Blood pressure 137/86, pulse 76, temperature 97.6  F (36.4  C), temperature source Oral, resp. rate 16, height 1.778 m (5' 10\"), weight 73.9 kg (162 lb 14.4 oz), SpO2 95 %.   Code status on discharge: Full Code     History of Illness:  See detailed admission note for full details.    Physical Exam:  Blood pressure 137/86, pulse 76, temperature 97.6  F (36.4  C), " "temperature source Oral, resp. rate 16, height 1.778 m (5' 10\"), weight 73.9 kg (162 lb 14.4 oz), SpO2 95 %.  Wt Readings from Last 1 Encounters:   10/27/17 73.9 kg (162 lb 14.4 oz)     General: Alert, awake, no acute distress.  HEENT: NC/AT, eyes anicteric, external occular movements intact, face symmetric.  Dentition WNL, MM moist.  Cardiac: RRR, S1, S2.  No murmurs appreciated.  Pulmonary: bilateral rales.  Otherwise Normal chest rise, normal work of breathing.  Lungs CTA BL.  Chest ttp in right lower lateral area.  Abdomen: soft, non-tender, non-distended.  Bowel Sounds Present.  No guarding.  Extremities: no deformities.  Warm, well perfused.  Skin: no rashes or lesions noted.  Warm and Dry.  Neuro: No focal deficits noted.  Speech clear.  Coordination and strength grossly normal.  Psych: Appropriate affect.    Procedures other than Imaging:  None.     Imaging:  Recent Results (from the past 48 hour(s))   Head CT w/o contrast    Narrative    CT SCAN OF THE HEAD WITHOUT CONTRAST   10/27/2017 2:43 PM     HISTORY: Trauma, history of alcohol abuse, occipital scalp laceration,  evaluate for bleed.    TECHNIQUE:  Axial images of the head and coronal reformations without  IV contrast material.  Radiation dose for this scan was reduced using  automated exposure control, adjustment of the mA and/or kV according  to patient size, or iterative reconstruction technique.    COMPARISON: None.    FINDINGS:  The ventricles are normal in size, shape and configuration.   The brain parenchyma and subarachnoid spaces are normal. There is no  evidence of intracranial hemorrhage, mass, acute infarct or anomaly.     The visualized portions of the sinuses and mastoids appear normal.  There is no evidence of trauma.      Impression    IMPRESSION:   Normal CT scan of the head.    NATY WATKINS MD   Chest CT w IV contrast only, TRAUMA / DISSECTION    Narrative    CT CHEST WITH CONTRAST   10/27/2017 2:43 PM     HISTORY: Right-sided rib " tenderness after fall, evaluate for pneumo,  rib fractures.    TECHNIQUE:  80 mL Isovue-370. CT images of the chest after nonionic  intravenous contrast. Radiation dose for this scan was reduced using  automated exposure control, adjustment of the mA and/or kV according  to patient size, or iterative reconstruction technique.    COMPARISON: None.    FINDINGS: There is no pneumothorax. There are patchy abnormal  opacities in the right lung, predominantly the right lower lobe. There  is a trace right pleural effusion. There is also minimal patchy  opacity at the base of the lingula and in the medial left lower lobe.  There are nondisplaced and minimally displaced fractures of  right-sided ribs 6, 7, 8, 9, 10, and 11. No left-sided rib fractures.  There is a prominent but not pathologically enlarged precarinal lymph  node. No hilar or axillary adenopathy.      Impression    IMPRESSION:  1. Patchy abnormal airspace opacities in both lungs, most  predominantly in the right lower lobe are compatible with pneumonia.  2. Nondisplaced and minimally displaced right-sided rib fractures.  3. Trace right pleural effusion.    HENRY MARIN MD   XR Chest 1 View    Narrative    XR CHEST 1 VW 10/27/2017 3:03 PM    COMPARISON: CT on the same day.    HISTORY: Fall, right-sided chest pain.      Impression    IMPRESSION: Right basilar atelectasis and mildly displaced right-sided  rib fractures again seen, better evaluated on CT earlier today. Left  lung is clear. No seen on either side.    MEGHAN DOSS MD        Consultations:  Consultation during this admission received from surgery.       Recent Lab Results:    Recent Labs  Lab 10/28/17  0755 10/27/17  1344   WBC 9.4 15.2*   HGB 12.9* 14.2   HCT 38.1* 41.7   * 106*    305          Lab Results   Component Value Date     10/27/2017     04/07/2007     04/05/2007    Lab Results   Component Value Date    CHLORIDE 98 10/27/2017    CHLORIDE 100 04/07/2007     CHLORIDE 96 04/05/2007    Lab Results   Component Value Date    BUN 13 10/27/2017    BUN 9 04/07/2007    BUN 15 04/05/2007      Lab Results   Component Value Date    POTASSIUM 4.0 10/27/2017    POTASSIUM 4.2 04/07/2007    POTASSIUM 3.5 04/05/2007    Lab Results   Component Value Date    CO2 32 10/27/2017    CO2 28 04/07/2007    CO2 29 04/05/2007    Lab Results   Component Value Date    CR 0.59 10/27/2017    CR 0.66 04/07/2007    CR 0.77 04/05/2007               Pending Results:  Unresulted Labs Ordered in the Past 30 Days of this Admission     Date and Time Order Name Status Description    10/27/2017 1533 Blood culture Preliminary     10/27/2017 1533 Blood culture Preliminary            Discharge Instructions and Follow-Up:     Discharge Procedure Orders  Reason for your hospital stay   Order Comments: You were admitted for a fall with rib fractures (6 days prior to admission) found to have a pneumonia.  You have been treated with antibiotics.  Moving forward, you should continue to practice deep breathing and use the incentive spirometry machine.     Follow-up and recommended labs and tests    Order Comments: Follow up with primary care provider, [facility], within 5-7 days for hospital follow- up.     Activity   Order Comments: Your activity upon discharge: gradually resume light activity as tolerated but do not drive if you have been using any narcotic pain medications or drinking alcohol.   Order Specific Question Answer Comments   Is discharge order? Yes      Full Code     Diet   Order Comments: Follow this diet upon discharge: Orders Placed This Encounter     Regular Diet Adult   Order Specific Question Answer Comments   Is discharge order? Yes          Total time spent in face to face contact with the patient and coordinating discharge was:  35 Minutes.

## 2017-10-28 NOTE — PLAN OF CARE
Problem: Patient Care Overview  Goal: Plan of Care/Patient Progress Review  Outcome: No Change  PRIMARY DIAGNOSIS: PNEUMONIA  OUTPATIENT/OBSERVATION GOALS TO BE MET BEFORE DISCHARGE:  1. Dyspnea improved and O2 sats >88% on RA or back to baseline O2 levels: Yes  SpO2: 95 %, O2 Device: None (Room air)      2. Tolerating oral abx or appropriate plans made outpatient infusion: Yes, on oral and IV ABX      3. Vitals signs normal or return to baseline: Yes      4. Short term supplemental O2 needed with activity at home: No      5. Tolerate oral intake to maintain hydration: Yes      6. Return to near baseline physical activity: No, still week and unsteady     Discharge Planner Nurse   Safe discharge environment identified: Yes  Barriers to discharge: No       Entered by: Mick Bartlett 10/28/2017 9:26 AM      VSS, Pt A&O but forgetful, flat affect. Denies pain at rest, voiding appropriately, LS diminished with expiratory wheeze, shallow breaths.  CIWA 2     Please review provider order for any additional goals.   Nurse to notify provider when observation goals have been met and patient is ready for discharge.

## 2017-10-28 NOTE — PLAN OF CARE
Problem: Patient Care Overview  Goal: Plan of Care/Patient Progress Review  Outcome: Adequate for Discharge Date Met:  10/28/17  Pt A&O, VSS. Discharge instructions reviewed with Pt, and questions answered. Pt to discharge home and f/u with primary w/in a week. New medications Azithromycin, and cefuroxime along with stool softener sent with Pt. Pt to d/c via [employer].

## 2017-10-28 NOTE — PROVIDER NOTIFICATION
"Paged Dr. Chamorro, \" Lab called with positive blood cultures. Gram positive cocci in clusters from L hand drawn 10/27 at 2pm.\"  "

## 2017-10-28 NOTE — PROGRESS NOTES
"[facility]  General Surgery Progress Note           Assessment and Plan:   Assessment:   Right rib fractures  Pneumonia  scalp laceration      Plan:   -pneumonia - antibiotics per hospitalist  Rib fractures - pain management, IS, ambulate  Scalp lac - heal by secondary intent, local wound care         Interval History:   doing well; no cp, sob, n/v/d, or abd pain. Rib pain mild         Physical Exam:   Blood pressure 137/86, pulse 76, temperature 97.6  F (36.4  C), temperature source Oral, resp. rate 16, height 1.778 m (5' 10\"), weight 73.9 kg (162 lb 14.4 oz), SpO2 95 %.    I/O last 3 completed shifts:  In: 810 [P.O.:400; I.V.:410]  Out: 800 [Urine:800]    Chest / Breast:   Nl resp effort         Skin:   Scalp lac - small, no sign of infection, no intervention needed                 Data:     Recent Labs   Lab Test  10/28/17   0755  10/27/17   1344   HGB  12.9*  14.2   WBC  9.4  15.2*     Christos West MD     "

## 2017-10-28 NOTE — PLAN OF CARE
"PRIMARY DIAGNOSIS: \"GENERIC\" NURSING  OUTPATIENT/OBSERVATION GOALS TO BE MET BEFORE DISCHARGE:  1. ADLs back to baseline: No    2. Activity and level of assistance: Up with minimal assistance.    3. Pain status: Pain free.    4. Return to near baseline physical activity: Yes     Discharge Planner Nurse   Safe discharge environment identified: Yes  Barriers to discharge: No       Entered by: Lucita Braga 10/28/2017 6:27 AM      Patient's VS are stable, afebrile, denies pain, able to use urinal at bedside with increased time to catch breath between movements. Inspiratory wheezing auscultated in lungs. Continued monitoring for s/s of alcohol withdrawal.     Please review provider order for any additional goals.   Nurse to notify provider when observation goals have been met and patient is ready for discharge.  "

## 2017-10-28 NOTE — PROGRESS NOTES
SW informed patient is ready for d/c and transportation is required. [facility] arranged for [employer] for patient to get home. Communication with patient and nurse Mick.    XIOMY Farley

## 2017-10-28 NOTE — PLAN OF CARE
"PRIMARY DIAGNOSIS: \"GENERIC\" NURSING  OUTPATIENT/OBSERVATION GOALS TO BE MET BEFORE DISCHARGE:  1. ADLs back to baseline: No    2. Activity and level of assistance: Up with minimal assistance.    3. Pain status: Improved-controlled with oral pain medications.    4. Return to near baseline physical activity: Yes     Discharge Planner Nurse   Safe discharge environment identified: Yes  Barriers to discharge: No    Patient admits to alcoholic intake near 1pint of vodka daily but denies symptoms/withdrawal without alcohol. VS stable, CIW score 2 related to some restlessness/anxiety and minimal sweating. Refused intervention for anxiety.       Entered by: Lucita Braga 10/28/2017 3:09 AM     Please review provider order for any additional goals.   Nurse to notify provider when observation goals have been met and patient is ready for discharge.  "

## 2017-10-28 NOTE — PLAN OF CARE
Problem: Patient Care Overview  Goal: Plan of Care/Patient Progress Review  Outcome: No Change  Patient up with standby assist at bedside, voiding in urinal.  Pain managed with Ibuprofen PO.  PRN melatonin given at 2044.  Scoring 0 on CIWA.  Small laceration to back of head/scalp with no drainage.  Regular diet. Right LL crackles.

## 2017-11-02 LAB
BACTERIA SPEC CULT: NO GROWTH
BACTERIA SPEC CULT: NO GROWTH
Lab: NORMAL
Lab: NORMAL
SPECIMEN SOURCE: NORMAL
SPECIMEN SOURCE: NORMAL

## 2020-01-01 ENCOUNTER — APPOINTMENT (OUTPATIENT)
Dept: CT IMAGING | Facility: CLINIC | Age: 50
DRG: 897 | End: 2020-01-01
Attending: PHYSICIAN ASSISTANT
Payer: COMMERCIAL

## 2020-01-01 ENCOUNTER — HOSPITAL ENCOUNTER (OUTPATIENT)
Dept: ULTRASOUND IMAGING | Facility: CLINIC | Age: 50
Discharge: HOME | End: 2020-05-04
Attending: INTERNAL MEDICINE | Admitting: INTERNAL MEDICINE
Payer: COMMERCIAL

## 2020-01-01 ENCOUNTER — TELEPHONE (OUTPATIENT)
Dept: INTERNAL MEDICINE | Facility: CLINIC | Age: 50
End: 2020-01-01

## 2020-01-01 ENCOUNTER — HOSPITAL ENCOUNTER (INPATIENT)
Facility: CLINIC | Age: 50
LOS: 2 days | Discharge: HOME | DRG: 897 | End: 2020-04-21
Attending: PHYSICIAN ASSISTANT | Admitting: INTERNAL MEDICINE
Payer: COMMERCIAL

## 2020-01-01 ENCOUNTER — HOSPITAL ENCOUNTER (EMERGENCY)
Facility: CLINIC | Age: 50
Discharge: HOME | End: 2020-05-04
Attending: PHYSICIAN ASSISTANT | Admitting: PHYSICIAN ASSISTANT
Payer: COMMERCIAL

## 2020-01-01 ENCOUNTER — APPOINTMENT (OUTPATIENT)
Dept: CT IMAGING | Facility: CLINIC | Age: 50
End: 2020-01-01
Attending: PHYSICIAN ASSISTANT
Payer: COMMERCIAL

## 2020-01-01 ENCOUNTER — VIRTUAL VISIT (OUTPATIENT)
Dept: INTERNAL MEDICINE | Facility: CLINIC | Age: 50
End: 2020-01-01
Payer: COMMERCIAL

## 2020-01-01 ENCOUNTER — APPOINTMENT (OUTPATIENT)
Dept: GENERAL RADIOLOGY | Facility: CLINIC | Age: 50
End: 2020-01-01
Attending: PHYSICIAN ASSISTANT
Payer: COMMERCIAL

## 2020-01-01 VITALS
TEMPERATURE: 98.4 F | OXYGEN SATURATION: 100 % | SYSTOLIC BLOOD PRESSURE: 135 MMHG | RESPIRATION RATE: 18 BRPM | HEART RATE: 82 BPM | DIASTOLIC BLOOD PRESSURE: 86 MMHG

## 2020-01-01 VITALS
HEART RATE: 86 BPM | TEMPERATURE: 98.4 F | RESPIRATION RATE: 18 BRPM | DIASTOLIC BLOOD PRESSURE: 84 MMHG | SYSTOLIC BLOOD PRESSURE: 133 MMHG | OXYGEN SATURATION: 93 %

## 2020-01-01 DIAGNOSIS — R94.31 PROLONGED QT INTERVAL: ICD-10-CM

## 2020-01-01 DIAGNOSIS — R56.9 ALCOHOL WITHDRAWAL SEIZURE (H): Primary | ICD-10-CM

## 2020-01-01 DIAGNOSIS — R79.89 LFT ELEVATION: ICD-10-CM

## 2020-01-01 DIAGNOSIS — R56.9 ALCOHOL WITHDRAWAL SEIZURE WITHOUT COMPLICATION (H): ICD-10-CM
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
## 2020-01-01 DIAGNOSIS — Z09 HOSPITAL DISCHARGE FOLLOW-UP: Primary | ICD-10-CM

## 2020-01-01 DIAGNOSIS — F10.10 ALCOHOL ABUSE: ICD-10-CM

## 2020-01-01 DIAGNOSIS — E83.42 HYPOMAGNESEMIA: ICD-10-CM

## 2020-01-01 DIAGNOSIS — F10.929 ALCOHOL INTOXICATION (H): ICD-10-CM

## 2020-01-01 DIAGNOSIS — F10.939 ALCOHOL WITHDRAWAL SEIZURE (H): Primary | ICD-10-CM

## 2020-01-01 DIAGNOSIS — F10.930 ALCOHOL WITHDRAWAL SEIZURE WITHOUT COMPLICATION (H): ICD-10-CM

## 2020-01-01 DIAGNOSIS — Z78.9 ALCOHOL USE: ICD-10-CM

## 2020-01-01 DIAGNOSIS — S22.49XA RIB FRACTURES: ICD-10-CM

## 2020-01-01 DIAGNOSIS — R07.89 ATYPICAL CHEST PAIN: ICD-10-CM

## 2020-01-01 DIAGNOSIS — R40.20 LOSS OF CONSCIOUSNESS (H): ICD-10-CM

## 2020-01-01 LAB
ALBUMIN SERPL-MCNC: 3.1 G/DL (ref 3.4–5)
ALBUMIN SERPL-MCNC: 3.7 G/DL (ref 3.4–5)
ALBUMIN SERPL-MCNC: 3.7 G/DL (ref 3.4–5)
ALP SERPL-CCNC: 112 U/L (ref 40–150)
ALP SERPL-CCNC: 124 U/L (ref 40–150)
ALP SERPL-CCNC: 128 U/L (ref 40–150)
ALT SERPL W P-5'-P-CCNC: 48 U/L (ref 0–70)
ALT SERPL W P-5'-P-CCNC: 58 U/L (ref 0–70)
ALT SERPL W P-5'-P-CCNC: 73 U/L (ref 0–70)
AMPHETAMINES UR QL SCN: NEGATIVE
ANION GAP SERPL CALCULATED.3IONS-SCNC: 6 MMOL/L (ref 3–14)
ANION GAP SERPL CALCULATED.3IONS-SCNC: 6 MMOL/L (ref 3–14)
ANION GAP SERPL CALCULATED.3IONS-SCNC: 8 MMOL/L (ref 3–14)
AST SERPL W P-5'-P-CCNC: 48 U/L (ref 0–45)
AST SERPL W P-5'-P-CCNC: 72 U/L (ref 0–45)
AST SERPL W P-5'-P-CCNC: 82 U/L (ref 0–45)
BARBITURATES UR QL: NEGATIVE
BASOPHILS # BLD AUTO: 0.1 10E9/L (ref 0–0.2)
BASOPHILS # BLD AUTO: 0.1 10E9/L (ref 0–0.2)
BASOPHILS NFR BLD AUTO: 1.3 %
BASOPHILS NFR BLD AUTO: 1.4 %
BENZODIAZ UR QL: NEGATIVE
BILIRUB DIRECT SERPL-MCNC: 0.3 MG/DL (ref 0–0.2)
BILIRUB SERPL-MCNC: 0.8 MG/DL (ref 0.2–1.3)
BILIRUB SERPL-MCNC: 1 MG/DL (ref 0.2–1.3)
BILIRUB SERPL-MCNC: 1.3 MG/DL (ref 0.2–1.3)
BUN SERPL-MCNC: 10 MG/DL (ref 7–30)
BUN SERPL-MCNC: 6 MG/DL (ref 7–30)
BUN SERPL-MCNC: 9 MG/DL (ref 7–30)
CALCIUM SERPL-MCNC: 8.3 MG/DL (ref 8.5–10.1)
CALCIUM SERPL-MCNC: 8.8 MG/DL (ref 8.5–10.1)
CALCIUM SERPL-MCNC: 8.9 MG/DL (ref 8.5–10.1)
CANNABINOIDS UR QL SCN: NEGATIVE
CHLORIDE SERPL-SCNC: 101 MMOL/L (ref 94–109)
CHLORIDE SERPL-SCNC: 103 MMOL/L (ref 94–109)
CHLORIDE SERPL-SCNC: 99 MMOL/L (ref 94–109)
CO2 SERPL-SCNC: 26 MMOL/L (ref 20–32)
CO2 SERPL-SCNC: 28 MMOL/L (ref 20–32)
CO2 SERPL-SCNC: 30 MMOL/L (ref 20–32)
COCAINE UR QL: NEGATIVE
CREAT SERPL-MCNC: 0.52 MG/DL (ref 0.66–1.25)
CREAT SERPL-MCNC: 0.55 MG/DL (ref 0.66–1.25)
CREAT SERPL-MCNC: 0.56 MG/DL (ref 0.66–1.25)
DIFFERENTIAL METHOD BLD: ABNORMAL
DIFFERENTIAL METHOD BLD: ABNORMAL
EOSINOPHIL # BLD AUTO: 0 10E9/L (ref 0–0.7)
EOSINOPHIL # BLD AUTO: 0.1 10E9/L (ref 0–0.7)
EOSINOPHIL NFR BLD AUTO: 0.2 %
EOSINOPHIL NFR BLD AUTO: 1.4 %
ERYTHROCYTE [DISTWIDTH] IN BLOOD BY AUTOMATED COUNT: 12.3 % (ref 10–15)
ERYTHROCYTE [DISTWIDTH] IN BLOOD BY AUTOMATED COUNT: 12.5 % (ref 10–15)
ERYTHROCYTE [DISTWIDTH] IN BLOOD BY AUTOMATED COUNT: 13.2 % (ref 10–15)
ETHANOL SERPL-MCNC: 0.12 G/DL
ETHANOL SERPL-MCNC: <0.01 G/DL
GFR SERPL CREATININE-BSD FRML MDRD: >90 ML/MIN/{1.73_M2}
GLUCOSE SERPL-MCNC: 130 MG/DL (ref 70–99)
GLUCOSE SERPL-MCNC: 80 MG/DL (ref 70–99)
GLUCOSE SERPL-MCNC: 83 MG/DL (ref 70–99)
HCT VFR BLD AUTO: 35.9 % (ref 40–53)
HCT VFR BLD AUTO: 38.2 % (ref 40–53)
HCT VFR BLD AUTO: 38.7 % (ref 40–53)
HGB BLD-MCNC: 11.9 G/DL (ref 13.3–17.7)
HGB BLD-MCNC: 12.5 G/DL (ref 13.3–17.7)
HGB BLD-MCNC: 12.6 G/DL (ref 13.3–17.7)
IMM GRANULOCYTES # BLD: 0 10E9/L (ref 0–0.4)
IMM GRANULOCYTES # BLD: 0 10E9/L (ref 0–0.4)
IMM GRANULOCYTES NFR BLD: 0.3 %
IMM GRANULOCYTES NFR BLD: 0.8 %
INTERPRETATION ECG - MUSE: NORMAL
LIPASE SERPL-CCNC: 182 U/L (ref 73–393)
LYMPHOCYTES # BLD AUTO: 0.4 10E9/L (ref 0.8–5.3)
LYMPHOCYTES # BLD AUTO: 1.2 10E9/L (ref 0.8–5.3)
LYMPHOCYTES NFR BLD AUTO: 16.1 %
LYMPHOCYTES NFR BLD AUTO: 9 %
MAGNESIUM SERPL-MCNC: 1.5 MG/DL (ref 1.6–2.3)
MAGNESIUM SERPL-MCNC: 1.7 MG/DL (ref 1.6–2.3)
MCH RBC QN AUTO: 35.3 PG (ref 26.5–33)
MCH RBC QN AUTO: 35.7 PG (ref 26.5–33)
MCH RBC QN AUTO: 35.8 PG (ref 26.5–33)
MCHC RBC AUTO-ENTMCNC: 32.6 G/DL (ref 31.5–36.5)
MCHC RBC AUTO-ENTMCNC: 32.7 G/DL (ref 31.5–36.5)
MCHC RBC AUTO-ENTMCNC: 33.1 G/DL (ref 31.5–36.5)
MCV RBC AUTO: 108 FL (ref 78–100)
MCV RBC AUTO: 108 FL (ref 78–100)
MCV RBC AUTO: 110 FL (ref 78–100)
MONOCYTES # BLD AUTO: 0.6 10E9/L (ref 0–1.3)
MONOCYTES # BLD AUTO: 0.6 10E9/L (ref 0–1.3)
MONOCYTES NFR BLD AUTO: 12.9 %
MONOCYTES NFR BLD AUTO: 7.5 %
NEUTROPHILS # BLD AUTO: 3.6 10E9/L (ref 1.6–8.3)
NEUTROPHILS # BLD AUTO: 5.7 10E9/L (ref 1.6–8.3)
NEUTROPHILS NFR BLD AUTO: 73.3 %
NEUTROPHILS NFR BLD AUTO: 75.8 %
NRBC # BLD AUTO: 0 10*3/UL
NRBC # BLD AUTO: 0 10*3/UL
NRBC BLD AUTO-RTO: 0 /100
NRBC BLD AUTO-RTO: 0 /100
OPIATES UR QL SCN: NEGATIVE
PCP UR QL SCN: NEGATIVE
PLATELET # BLD AUTO: 118 10E9/L (ref 150–450)
PLATELET # BLD AUTO: 131 10E9/L (ref 150–450)
PLATELET # BLD AUTO: 185 10E9/L (ref 150–450)
POTASSIUM SERPL-SCNC: 3.8 MMOL/L (ref 3.4–5.3)
POTASSIUM SERPL-SCNC: 4 MMOL/L (ref 3.4–5.3)
POTASSIUM SERPL-SCNC: 5 MMOL/L (ref 3.4–5.3)
PROT SERPL-MCNC: 7 G/DL (ref 6.8–8.8)
PROT SERPL-MCNC: 7.9 G/DL (ref 6.8–8.8)
PROT SERPL-MCNC: 8 G/DL (ref 6.8–8.8)
RBC # BLD AUTO: 3.32 10E12/L (ref 4.4–5.9)
RBC # BLD AUTO: 3.53 10E12/L (ref 4.4–5.9)
RBC # BLD AUTO: 3.54 10E12/L (ref 4.4–5.9)
SODIUM SERPL-SCNC: 133 MMOL/L (ref 133–144)
SODIUM SERPL-SCNC: 135 MMOL/L (ref 133–144)
SODIUM SERPL-SCNC: 139 MMOL/L (ref 133–144)
TROPONIN I SERPL-MCNC: <0.015 UG/L (ref 0–0.04)
TROPONIN I SERPL-MCNC: <0.015 UG/L (ref 0–0.04)
WBC # BLD AUTO: 4.8 10E9/L (ref 4–11)
WBC # BLD AUTO: 7.7 10E9/L (ref 4–11)
WBC # BLD AUTO: 8 10E9/L (ref 4–11)

## 2020-01-01 PROCEDURE — 99232 SBSQ HOSP IP/OBS MODERATE 35: CPT | Performed by: INTERNAL MEDICINE

## 2020-01-01 PROCEDURE — 51798 US URINE CAPACITY MEASURE: CPT

## 2020-01-01 PROCEDURE — 80076 HEPATIC FUNCTION PANEL: CPT | Performed by: INTERNAL MEDICINE

## 2020-01-01 PROCEDURE — 99203 OFFICE O/P NEW LOW 30 MIN: CPT | Mod: TEL | Performed by: INTERNAL MEDICINE

## 2020-01-01 PROCEDURE — 83690 ASSAY OF LIPASE: CPT | Performed by: PHYSICIAN ASSISTANT

## 2020-01-01 PROCEDURE — 96365 THER/PROPH/DIAG IV INF INIT: CPT

## 2020-01-01 PROCEDURE — 93005 ELECTROCARDIOGRAM TRACING: CPT

## 2020-01-01 PROCEDURE — 80307 DRUG TEST PRSMV CHEM ANLYZR: CPT | Performed by: PHYSICIAN ASSISTANT

## 2020-01-01 PROCEDURE — 70450 CT HEAD/BRAIN W/O DYE: CPT

## 2020-01-01 PROCEDURE — 83735 ASSAY OF MAGNESIUM: CPT | Performed by: INTERNAL MEDICINE

## 2020-01-01 PROCEDURE — 80053 COMPREHEN METABOLIC PANEL: CPT | Performed by: PHYSICIAN ASSISTANT

## 2020-01-01 PROCEDURE — 96361 HYDRATE IV INFUSION ADD-ON: CPT

## 2020-01-01 PROCEDURE — 80320 DRUG SCREEN QUANTALCOHOLS: CPT | Performed by: PHYSICIAN ASSISTANT

## 2020-01-01 PROCEDURE — 99223 1ST HOSP IP/OBS HIGH 75: CPT | Mod: AI | Performed by: INTERNAL MEDICINE

## 2020-01-01 PROCEDURE — 85025 COMPLETE CBC W/AUTO DIFF WBC: CPT | Performed by: PHYSICIAN ASSISTANT

## 2020-01-01 PROCEDURE — 80048 BASIC METABOLIC PNL TOTAL CA: CPT | Performed by: INTERNAL MEDICINE

## 2020-01-01 PROCEDURE — 83735 ASSAY OF MAGNESIUM: CPT | Performed by: PHYSICIAN ASSISTANT

## 2020-01-01 PROCEDURE — 36415 COLL VENOUS BLD VENIPUNCTURE: CPT | Performed by: INTERNAL MEDICINE

## 2020-01-01 PROCEDURE — 25800030 ZZH RX IP 258 OP 636: Performed by: INTERNAL MEDICINE

## 2020-01-01 PROCEDURE — 12000000 ZZH R&B MED SURG/OB

## 2020-01-01 PROCEDURE — 25000132 ZZH RX MED GY IP 250 OP 250 PS 637: Performed by: INTERNAL MEDICINE

## 2020-01-01 PROCEDURE — 93005 ELECTROCARDIOGRAM TRACING: CPT | Mod: 76

## 2020-01-01 PROCEDURE — 99285 EMERGENCY DEPT VISIT HI MDM: CPT | Mod: 25

## 2020-01-01 PROCEDURE — 25800030 ZZH RX IP 258 OP 636: Performed by: EMERGENCY MEDICINE

## 2020-01-01 PROCEDURE — 71101 X-RAY EXAM UNILAT RIBS/CHEST: CPT | Mod: LT

## 2020-01-01 PROCEDURE — 76705 ECHO EXAM OF ABDOMEN: CPT

## 2020-01-01 PROCEDURE — 99207 ZZC CDG-CODE CATEGORY CHANGED: CPT | Performed by: INTERNAL MEDICINE

## 2020-01-01 PROCEDURE — 25800030 ZZH RX IP 258 OP 636: Performed by: PHYSICIAN ASSISTANT

## 2020-01-01 PROCEDURE — 25000125 ZZHC RX 250: Performed by: EMERGENCY MEDICINE

## 2020-01-01 PROCEDURE — 96376 TX/PRO/DX INJ SAME DRUG ADON: CPT

## 2020-01-01 PROCEDURE — 25000128 H RX IP 250 OP 636: Performed by: PHYSICIAN ASSISTANT

## 2020-01-01 PROCEDURE — 84484 ASSAY OF TROPONIN QUANT: CPT | Performed by: PHYSICIAN ASSISTANT

## 2020-01-01 PROCEDURE — 25000128 H RX IP 250 OP 636: Performed by: EMERGENCY MEDICINE

## 2020-01-01 PROCEDURE — HZ2ZZZZ DETOXIFICATION SERVICES FOR SUBSTANCE ABUSE TREATMENT: ICD-10-PCS | Performed by: EMERGENCY MEDICINE

## 2020-01-01 PROCEDURE — 96375 TX/PRO/DX INJ NEW DRUG ADDON: CPT

## 2020-01-01 PROCEDURE — 85027 COMPLETE CBC AUTOMATED: CPT | Performed by: INTERNAL MEDICINE

## 2020-01-01 PROCEDURE — 99239 HOSP IP/OBS DSCHRG MGMT >30: CPT | Performed by: INTERNAL MEDICINE

## 2020-01-01 PROCEDURE — 96360 HYDRATION IV INFUSION INIT: CPT

## 2020-01-01 RX ORDER — SODIUM CHLORIDE, SODIUM LACTATE, POTASSIUM CHLORIDE, CALCIUM CHLORIDE 600; 310; 30; 20 MG/100ML; MG/100ML; MG/100ML; MG/100ML
INJECTION, SOLUTION INTRAVENOUS CONTINUOUS
Status: ACTIVE | OUTPATIENT
Start: 2020-01-01 | End: 2020-01-01

## 2020-01-01 RX ORDER — MAGNESIUM SULFATE HEPTAHYDRATE 40 MG/ML
2 INJECTION, SOLUTION INTRAVENOUS ONCE
Status: COMPLETED | OUTPATIENT
Start: 2020-01-01 | End: 2020-01-01

## 2020-01-01 RX ORDER — LIDOCAINE 40 MG/G
CREAM TOPICAL
Status: DISCONTINUED | OUTPATIENT
Start: 2020-01-01 | End: 2020-01-01 | Stop reason: HOSPADM

## 2020-01-01 RX ORDER — DIAZEPAM 10 MG/2ML
5 INJECTION, SOLUTION INTRAMUSCULAR; INTRAVENOUS ONCE
Status: COMPLETED | OUTPATIENT
Start: 2020-01-01 | End: 2020-01-01

## 2020-01-01 RX ORDER — NALOXONE HYDROCHLORIDE 0.4 MG/ML
.1-.4 INJECTION, SOLUTION INTRAMUSCULAR; INTRAVENOUS; SUBCUTANEOUS
Status: DISCONTINUED | OUTPATIENT
Start: 2020-01-01 | End: 2020-01-01 | Stop reason: HOSPADM

## 2020-01-01 RX ORDER — LANOLIN ALCOHOL/MO/W.PET/CERES
100 CREAM (GRAM) TOPICAL DAILY
Status: DISCONTINUED | OUTPATIENT
Start: 2020-01-01 | End: 2020-01-01 | Stop reason: HOSPADM

## 2020-01-01 RX ORDER — LORAZEPAM 1 MG/1
1-2 TABLET ORAL EVERY 30 MIN PRN
Status: DISCONTINUED | OUTPATIENT
Start: 2020-01-01 | End: 2020-01-01 | Stop reason: HOSPADM

## 2020-01-01 RX ORDER — LORAZEPAM 2 MG/ML
1-2 INJECTION INTRAMUSCULAR EVERY 30 MIN PRN
Status: DISCONTINUED | OUTPATIENT
Start: 2020-01-01 | End: 2020-01-01 | Stop reason: HOSPADM

## 2020-01-01 RX ORDER — DIAZEPAM 10 MG/2ML
5-10 INJECTION, SOLUTION INTRAMUSCULAR; INTRAVENOUS EVERY 30 MIN PRN
Status: DISCONTINUED | OUTPATIENT
Start: 2020-01-01 | End: 2020-01-01 | Stop reason: HOSPADM

## 2020-01-01 RX ORDER — NICOTINE 21 MG/24HR
1 PATCH, TRANSDERMAL 24 HOURS TRANSDERMAL DAILY
Status: DISCONTINUED | OUTPATIENT
Start: 2020-01-01 | End: 2020-01-01 | Stop reason: HOSPADM

## 2020-01-01 RX ORDER — FOLIC ACID 1 MG/1
1 TABLET ORAL DAILY
Status: DISCONTINUED | OUTPATIENT
Start: 2020-01-01 | End: 2020-01-01 | Stop reason: HOSPADM

## 2020-01-01 RX ORDER — MULTIPLE VITAMINS W/ MINERALS TAB 9MG-400MCG
1 TAB ORAL DAILY
Status: DISCONTINUED | OUTPATIENT
Start: 2020-01-01 | End: 2020-01-01 | Stop reason: HOSPADM

## 2020-01-01 RX ORDER — DIAZEPAM 5 MG
10 TABLET ORAL EVERY 30 MIN PRN
Status: DISCONTINUED | OUTPATIENT
Start: 2020-01-01 | End: 2020-01-01 | Stop reason: HOSPADM

## 2020-01-01 RX ORDER — ACETAMINOPHEN 325 MG/1
650 TABLET ORAL EVERY 4 HOURS PRN
Status: DISCONTINUED | OUTPATIENT
Start: 2020-01-01 | End: 2020-01-01 | Stop reason: HOSPADM

## 2020-01-01 RX ADMIN — SODIUM CHLORIDE, POTASSIUM CHLORIDE, SODIUM LACTATE AND CALCIUM CHLORIDE: 600; 310; 30; 20 INJECTION, SOLUTION INTRAVENOUS at 18:43

## 2020-01-01 RX ADMIN — MAGNESIUM SULFATE 2 G: 2 INJECTION INTRAVENOUS at 14:18

## 2020-01-01 RX ADMIN — DIAZEPAM 5 MG: 10 INJECTION, SOLUTION INTRAMUSCULAR; INTRAVENOUS at 13:33

## 2020-01-01 RX ADMIN — THIAMINE HCL TAB 100 MG 100 MG: 100 TAB at 18:44

## 2020-01-01 RX ADMIN — FOLIC ACID 1 MG: 1 TABLET ORAL at 08:12

## 2020-01-01 RX ADMIN — FOLIC ACID: 5 INJECTION, SOLUTION INTRAMUSCULAR; INTRAVENOUS; SUBCUTANEOUS at 16:35

## 2020-01-01 RX ADMIN — MULTIPLE VITAMINS W/ MINERALS TAB 1 TABLET: TAB at 08:12

## 2020-01-01 RX ADMIN — SODIUM CHLORIDE 1000 ML: 9 INJECTION, SOLUTION INTRAVENOUS at 12:13

## 2020-01-01 RX ADMIN — DIAZEPAM 5 MG: 10 INJECTION, SOLUTION INTRAMUSCULAR; INTRAVENOUS at 16:34

## 2020-01-01 RX ADMIN — FOLIC ACID 1 MG: 1 TABLET ORAL at 18:44

## 2020-01-01 RX ADMIN — THIAMINE HCL TAB 100 MG 100 MG: 100 TAB at 08:12

## 2020-01-01 RX ADMIN — NICOTINE 1 PATCH: 21 PATCH, EXTENDED RELEASE TRANSDERMAL at 22:42

## 2020-01-01 RX ADMIN — SODIUM CHLORIDE 1000 ML: 9 INJECTION, SOLUTION INTRAVENOUS at 13:19

## 2020-01-01 RX ADMIN — MULTIPLE VITAMINS W/ MINERALS TAB 1 TABLET: TAB at 18:43

## 2020-01-01 ASSESSMENT — ENCOUNTER SYMPTOMS
SEIZURES: 1
SEIZURES: 1
SHORTNESS OF BREATH: 1
FEVER: 0
CONFUSION: 0
SHORTNESS OF BREATH: 0
COUGH: 1
DIZZINESS: 0

## 2020-01-01 ASSESSMENT — ACTIVITIES OF DAILY LIVING (ADL)
ADLS_ACUITY_SCORE: 13
ADLS_ACUITY_SCORE: 13
ADLS_ACUITY_SCORE: 16
ADLS_ACUITY_SCORE: 16
ADLS_ACUITY_SCORE: 13

## 2020-04-19 PROBLEM — F10.939 ALCOHOL WITHDRAWAL (H): Status: ACTIVE | Noted: 2020-01-01

## 2020-04-19 NOTE — H&P
"[facility]    History and Physical - Hospitalist Service       Date of Admission:  4/19/2020     Assessment & Plan   Luis Carlos Nash is a 49-year-old male with a history of alcohol abuse who is admitted to the hospitalist service for seizure-like activity highly concerning for alcohol withdrawal seizure.    Seizure-like activity, concerning for alcohol withdrawal seizure  Prolonged QTc  - He had an episode of tonic-clonic activity seen [place] the [place].  - Does not sound as if anything like CPR was ever done.  His blood sugar was normal at the time.  - His CT head was negative on admission here.  - QTc was elevated, but clinical syndrome is not consistent with a dysrhythmia event.  Treat alcohol withdrawal as below, no other work-up indicated at this time.    Alcohol abuse  Alcohol dependence  Alcohol withdrawal with seizure  - Patient drinks 2-3 large glasses of vodka nightly basis.  Denies any history of complicated withdrawals.  - His seizure was most likely an alcohol withdrawal seizure.  - Start patient on CIWA scale with symptom triggered lorazepam.  - Supplement thiamine and folate.  - Patient declines chemical dependency consult.    Hypomagnesemia  Checked in the ED and 1.5, received 2 g magnesium sulfate.  Recheck in the a.m.     Tobacco abuse  Patient smokes 1 pack/day.  He declines nicotine replacement.    Diet: Regular  DVT Prophylaxis: Low Risk/Ambulatory with no VTE prophylaxis indicated  Stanton Catheter: no  Code Status: Full code    Disposition Plan   Expected discharge:   Depending on severity of alcohol withdrawal symptoms.  Likely 1 to 2 days.  Consult care coordinator for PCP follow-up.    Tyler Lackey MD  [facility]    ______________________________________________________________________    Chief Complaint   \"Seizures \"    History of Present Illness   Luis Carlos Nash is a 49 year old male with a history of alcohol abuse who " presented to the ED via EMS for evaluation of possible seizure activity.    History obtained from ED note, who were able to speak to EMS, as well as the patient.  Patient reports that he was feeling normal this morning when [place].  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  He did not have any symptoms prior to this episode.  EMS was called at the scene after tonic-clonic type seizure activity was apparently noticed.  His coworker stated he was not actually supposed to work today and he seemed a little bit confused.  His blood sugar was 150 on the scene.  He was brought to the ED.  In the ED his vital signs were stable.  His labs revealed  amount elevation in AST and ALT but otherwise unremarkable except for mildly low platelet count 131 and MCV of 108.  CT head was done without any abnormality.  EKG showed prolonged QTC at 549.  He received IV fluids, vitamins and Valium in the ED.    On interview, the patient states he is feeling better at this time.  He says he feels tired.  Corroborates the history above, stating that he did not have any symptoms prior to the episode and had otherwise been feeling well lately.  He admits to consuming 2-3 large glasses of vodka on a nightly basis, last drink was last night.  Has not had issues with alcohol withdrawal that he knows of before.  Denies any other drug use.    Review of Systems    The 10 point Review of Systems is negative other than noted in the HPI or here.     Physical Exam   /79   Pulse 80   Temp 99  F (37.2  C) (Temporal)   Resp 22   SpO2 95%        General: Sitting up in the bed, no acute distress.    HEENT: No scleral icterus. Oropharynx moist.  Mild bruising to the lateral tongue.    Neck: Supple.    Pulmonary: Normal work of breathing. Clear to auscultation bilaterally.    Cardiovascular: Regular rate and rhythm without murmur or extra heart sounds.    Abdomen: Soft and  non-tender.    Extremities: No peripheral edema. No clubbing or cyanosis.     Neurologic: Awake, alert, appropriate.    Skin: Warm and dry.    Psychiatric: Normal affect and mood.     Data   Data reviewed today: I have reviewed all labs and imaging results.    Recent Labs   Lab 04/19/20  1315   WBC 4.8   HGB 12.5*   *   *      POTASSIUM 4.0   CHLORIDE 99   CO2 30   BUN 9   CR 0.55*   ANIONGAP 6   MELL 8.9   *   ALBUMIN 3.7   PROTTOTAL 7.9   BILITOTAL 1.0   ALKPHOS 128   ALT 73*   AST 82*   TROPI <0.015     Recent Results (from the past 24 hour(s))   CT Head w/o Contrast    Narrative    CT OF THE HEAD WITHOUT CONTRAST April 19, 2020 2:10 PM     HISTORY: Head trauma, first time seizure.    TECHNIQUE: Axial CT images of the head from the skull base to the  vertex were acquired without IV contrast. Radiation dose for this scan  was reduced using automated exposure control, adjustment of the mA  and/or kV according to patient size, or iterative reconstruction  technique.    COMPARISON: Head CT 10/27/2017.    FINDINGS: The ventricles and basal cisterns are within normal limits  in configuration. There is no midline shift. There are no extra-axial  fluid collections.  Gray-white differentiation is well maintained.     No intracranial hemorrhage, mass or recent infarct.    The visualized paranasal sinuses are well-aerated. There is no  mastoiditis. There are no fractures of the visualized bones.      Impression    IMPRESSION: Normal head CT.              STEPHANIE JUAREZ MD       Past Medical History    I have reviewed this patient's medical history and updated it with pertinent information if needed.   History reviewed. No pertinent past medical history.     Past Surgical History    I have reviewed this patient's surgical history and updated it with pertinent information if needed.  History reviewed. No pertinent surgical history.     Social History    I have reviewed this patient's social history  "and updated it with pertinent information if needed.  Social History     Tobacco Use     Smoking status: Current Every Day Smoker     Packs/day: 1.00     Years: 20.00     Pack years: 20.00     Types: Cigarettes     Smokeless tobacco: Never Used   Substance Use Topics     Alcohol use: Yes     Comment: \"every few days I have 2 glasses of hard liquor\"     Drug use: Not Currently          Family History    I have reviewed this patient's family history and updated it with pertinent information if needed.   History reviewed. No pertinent family history.   None pertinent.    Prior to Admission Medications    Prior to Admission Medications   Prescriptions Last Dose Informant Patient Reported? Taking?   acetaminophen (TYLENOL) 500 MG tablet   No No   Sig: Take 1 tablet (500 mg) by mouth every 4 hours as needed for mild pain or fever   senna-docusate (SENOKOT-S;PERICOLACE) 8.6-50 MG per tablet   No No   Sig: Take 1-2 tablets by mouth 2 times daily as needed (constipation )      Facility-Administered Medications: None        Allergies    No Known Allergies      "

## 2020-04-19 NOTE — ED PROVIDER NOTES
Emergency Department Attending Supervision Note  4/19/2020  3:42 PM      I evaluated this patient in conjunction with Madonna Richardson PA.  I agree with the history, workup, and plan of care.       Briefly, the patient presented with loss of consciousness.  No history of seizures.  Reports going to work this [place] at the airport as he works at [employer].  EMS was called after reported tonic/clonic seizure.  Patient denied any symptoms prior to LOC and only remembers waking up on the ground.  Does admit to drinking 2-3 large glasses of vodka daily and estimates drinking 6 out of 7 days a week.  Last drink was reportedly last night.  Denies any history of withdrawal or alcohol withdrawal seizures.  Denies headache, chest pain, abdominal pain.    Physical Exam:  /72   Pulse 80   Temp 99.2  F (37.3  C) (Temporal)   Resp 18   SpO2 91%     General: Alert, no acute distress  Neuro:  Pupils 3 mm and reactive bilaterally.  EOMI.  No focal deficits; GCS 15  HEENT:  Moist mucous membranes.  Conjunctiva normal. Tongue fasiculations; tongue abrasion noted  CV:  RRR, no m/r/g, skin warm and well perfused  Pulm:  CTAB, no wheezes/ronchi/rales.  No acute distress, breathing comfortably  GI:  Soft, nontender, nondistended.  No rebound or guarding.  Normal bowel sounds  MSK:  Moving all extremities.  No focal areas of edema, erythema; bilateral upper extremity hand tremors; no cervical midline tenderness  Skin:  WWP, no rashes, no lower extremity edema, skin color normal, no diaphoresis  Psych:  normal affect, regular speech    Results:    ED course:    My impression is loss of consciousness likely secondary to alcohol withdrawal seizure.  He is noted to have tongue fasciculations and tongue abrasion and significant alcohol history.  CT head without any acute intracranial abnormality.  EKG shows prolonged QTC with notable T wave inversions in the anterior leads of V1, V2, V3.  No previous EKGs viewable to  compare.  Troponin is normal and patient denies chest pain.  Possible that patient had dysrhythmia given prolonged QT so he was given magnesium.  Lab studies remarkable for normal troponin, mildly elevated transaminases, undetectable blood alcohol, hypomagnesemia.  Results discussed with the patient.  He was started on CIWA protocol and will be admitted to medicine for continued monitoring and care.        Diagnosis    ICD-10-CM    1. Alcohol withdrawal seizure (H)  F10.239 Drug abuse screen 77 urine (FL, RH, SH)    R56.9    2. Alcohol use  Z72.89    3. Prolonged QT interval  R94.31    4. Hypomagnesemia  E83.42    5. Loss of consciousness (H)  R40.20          Madonna Richardson PA     Scribe Disclosure:  Paz SMITH, am serving as a scribe at 3:42 PM on 4/19/2020 to document services personally performed by Dayton Orta MD based on my observations and the provider's statements to me.        Dayton Orta MD  04/19/20 2702

## 2020-04-19 NOTE — ED NOTES
Pt attempted to urinate while standing up and could not. Pt reports he has the urge to go but can't start. Pt was bladder scanned and had 660mL in his bladder. Pt is trying to sit on a commode and go.

## 2020-04-19 NOTE — ED NOTES
Bed: ED30  Expected date: 4/19/20  Expected time: 12:54 PM  Means of arrival: Ambulance  Comments:  David [address]  49M  Seizure

## 2020-04-19 NOTE — ED PROVIDER NOTES
"  History     Chief Complaint:  Seizures    History limited by patient being poor historian.    HPI:  Luis Carlos Nash is a 49 year old male with a history of alcohol abuse who presents with for evaluation of a seizure. Patient arrives via EMS, who states that patient was on his way to work at the airport [employer] when he experienced a seizure and fell and hit his head. Per EMS, patient's vitals were normal with blood glucose 150. He has no history of seizures. The patient states that he remembers going to work [place] the airport this morning. He is able to recall going out for a smoke and coming back inside. Patient does not recall experiencing any dizziness, shortness of breath, or chest pain at that time, and he remembers waking up while still at the airport. Patient denies any tongue pain or urination during the episode. He does exhibit a mild resting tremor, although he states this is his normal baseline. He denies any heart or lung problems, cold symptoms, or change in sleep or appetite. Patient lives alone and does not take coagulant medications.  Per airport staff, the patient was not supposed to go to work today.  He was not scheduled.  Here patient states that he knows this and is unsure why he went to work this morning, he was \"mixed up\" with the schedule.  He reports that he had 2 drinks last night \"as always.\" He drinks 6 out of 7 days a week, 2-3 vodka drinks a night. Denies any alcohol use this morning.  Denies any withdrawal seizures. No current chest pain, shortness of breath, back pain, abdominal pain.    Allergies:  No known drug allergies     Medications:    The patient is not currently taking any prescribed medications.    Past Medical History:    Pneumonia  Chronic venous hypertension w/ ulceration  Varicose vein of leg  Habitual alcohol use     Past Surgical History:    History reviewed. No pertinent surgical history.     Family History:    Diabetes    Social History:  Smoking status: yes, 1 " ppd  Alcohol use: daily  Patient presents alone.  PCP: [facility]    Marital Status:  Single     Review of Systems   Respiratory: Negative for shortness of breath.    Cardiovascular: Negative for chest pain.   Neurological: Positive for seizures. Negative for dizziness.   All other systems reviewed and are negative.    Physical Exam     Patient Vitals for the past 24 hrs:   BP Temp Temp src Pulse Heart Rate Resp SpO2   04/19/20 1545 134/84 -- -- 75 75 -- --   04/19/20 1530 (!) 144/91 -- -- 80 76 -- --   04/19/20 1515 139/86 -- -- 73 77 -- --   04/19/20 1500 133/83 -- -- 74 78 -- --   04/19/20 1445 107/84 -- -- 73 80 -- --   04/19/20 1430 (!) 148/80 -- -- 81 89 -- --   04/19/20 1415 100/88 -- -- 88 -- -- 96 %   04/19/20 1345 135/82 -- -- 98 99 -- 95 %   04/19/20 1330 132/78 -- -- 96 92 -- 92 %   04/19/20 1315 137/80 -- -- -- 98 -- 93 %   04/19/20 1311 (!) 145/85 98.3  F (36.8  C) Oral 91 91 20 93 %     Physical Exam  General: Non-toxic appearing. Resting in the bed.   Skin: Good turgor, no rash, no unusual bruising or prominent lesions.  HEENT: Head: hematoma to the left front to the right frontal scalp. Mild tenderness throughout this region.    Eyes: Conjunctiva mildly injected bilaterally., sclera non-icteric, EOM intact, PERRL.   Throat/pharynx: Mils abrasions to tongue. Tongue fasciculations  Neck: Supple, without lesions or adenopathy.  Cardiac: Normal rate and regular rhythm, no murmur or gallop.   Lungs: Clear to auscultation and percussion   Abdomen: Bowel sounds normal, no tenderness, organomegaly, masses, or hernia. No guarding or rebound tenderness.   Musculoskeletal: Normal gait and station. Full strength in upper and lower extremities.   Neurologic: Mild resting tremor of bilateral hands.  CN II through XII intact.  Mildly slow to respond but is oriented to person, place, and time. Normal finger to nose, rapid hand movements, heel to shin. No pronator drift.   Psychiatric: Intact  recent and remote memory, judgment and insight, normal mood and affect.     Emergency Department Course     ECG (13:35:50):  Rate 85 bpm. CA interval 142. QRS duration 78. QT/QTc 462/549. P-R-T axes 65 47 43. Normal sinus rhythm. T wave abnormality, consider anterior ischemia. Prolonged QT. Interpreted at 1342 by Dayton Orta MD.    ECG (15:13:53):  Rate 71 bpm. CA interval 140. QRS duration 80. QT/QTc 474/515. P-R-T axes 48 53 46. Normal sinus rhythm. T wave abnormality, consider anterior ischemia. Prolonged QT. Interpreted at 1517 by Kaye Key MD.    Imaging:  Radiology findings were communicated with the patient who voiced understanding of the findings.    CT Head w/o Contrast  Normal head CT.   Per radiology.    Laboratory:  Laboratory findings were communicated with the patient who voiced understanding of the findings.    CBC: WBC 4.8, HGB 12.5 (L),  (L)  CMP: Glucose 130 (H), Creatinine 0.55 (L), Alt 73 (H), Ast 82 (H), o/w WNL   Alcohol level blood: < 0.01  Troponin (Collected 1315): <0.015  Magnesium: 1.5 (L)    Interventions:   1319 NS, 1000 ml, IV  1333 Valium, 5 mg, IV  1418 Magnesium Sulfate, 2 g, IV  1634 Valium 10 mg tab, PO    Emergency Department Course:  The patient arrived in the emergency department via EMS.     1306  Past medical records, nursing notes, and vitals reviewed.    1306  I performed an exam of the patient and obtained history, as documented above.     1333 IV was inserted and blood was drawn for laboratory testing, results above.    1335 EKG was taken in the ED.    1403 The patient was sent for a head CT while in the emergency department, results above.     1507 Patient rechecked and updated.     1513 EKG was taken in the ED.     1542 I discussed the treatment plan with the patient. They expressed understanding of this plan and consented to admission. I discussed the patient with Dr. Lackey, who will admit the patient to a monitored bed for further evaluation and  treatment.    Impression & Plan     Medical Decision Making:  Luis Carlos Nash is a 49 year old male who presents to the emergency department today for evaluation of loss of consciousness versus seizure.  Details of patient's history can be known the HPI.  Differentials considered include alcohol withdrawal seizure, first-time seizure, arrhythmia, metabolic derangement, cerebral edema, hypoglycemia, tumor, CVA, intracranial bleed, amongst others.  On my exam, the patient was mildly tremulous and with tongue fasciculations.  He was slow to respond but oriented x3.  Given his head trauma, head CT was obtained which returned within normal limits.  Basic labs indicated mild hypomagnesemia.  Subsequently, there was a prolonged QT on ECG along with T wave abnormality.  The patient has no chest pain here.  Troponin is within normal limits.    The patient has no history of seizures.  As he is mildly tremulous and has a long history of alcohol use, and I do not feel that he is being completely truthful about his alcohol use at this time, I do have high suspicion for withdrawal seizure. He has been started on CIWA, initial score 6 here.  He has been given valium.  However, given his ECG abnormalities, there is also concern for potential arrhythmia such as torsades also given his low magnesium here today.  Higher suspicion for withdrawal seizure given his tremor and tongue fasciculations here today.  Given these new findings and his loss of consciousness this morning, I do feel that he should be admitted for further cardiac monitoring and monitoring of withdrawal symptoms.  I discussed this case with Dr. Lackey who agreed to admit the patient to hospital for further management and care.  All questions were answered prior to discharge.  He was in agreement with the treatment plan as stated above.    Diagnosis:    ICD-10-CM    1. Alcohol withdrawal seizure (H)  F10.239 Drug abuse screen 77 urine (FL, RH, SH)    R56.9    2.  Alcohol use  Z72.89    3. Prolonged QT interval  R94.31    4. Hypomagnesemia  E83.42    5. Loss of consciousness (H)  R40.20       Disposition:  Admitted to Dr. Lackey.    Discharge Medications:  New Prescriptions    No medications on file     This was created at least in part with a voice recognition software. Mistakes/typos may be present.     Scribe Disclosure:  I, Paz Pereira, am serving as a scribe at 1:06 PM on 4/19/2020 to document services personally performed by Madonna Richardson PA based on my observations and the provider's statements to me.      Paz Pereira   4/19/2020   [facility] EMERGENCY DEPARTMENT     Madonna Richardson PA  04/19/20 [id number]

## 2020-04-19 NOTE — ED TRIAGE NOTES
Pt was on his way to work at [employer] [place] the airport when he had a witnessed tonic clonic seizure. Pt reportedly spit out some blood. Pt was postictal on the ambulance ride over but is oriented upon arrival to ED. ABCs intact upon arrival. No hx of seizures.

## 2020-04-19 NOTE — ED NOTES
[facility]  ED Nurse Handoff Report    Luis Carlos Nash is a 49 year old male   ED Chief complaint: Seizures  . ED Diagnosis:   Final diagnoses:   Alcohol use   Prolonged QT interval   Hypomagnesemia   Loss of consciousness (H)   Alcohol withdrawal seizure (H)     Allergies: No Known Allergies    Code Status: Full Code  Activity level - Baseline/Home:  Independent. Activity Level - Current:   Assist X 2. Lift room needed: No. Bariatric: No   Needed: No   Isolation: No. Infection: Not Applicable.     Vital Signs:   Vitals:    04/19/20 1500 04/19/20 1515 04/19/20 1530 04/19/20 1545   BP: 133/83 139/86 (!) 144/91 134/84   Pulse: 74 73 80 75   Resp:       Temp:       TempSrc:       SpO2:           Cardiac Rhythm:  ,      Pain level:    Patient confused: No. Patient Falls Risk: Yes.   Elimination Status: Unable to void   Patient Report - Initial Complaint: seizures. Focused Assessment: pt presented to ED after having a witnessed tonic clonic seizure this morning while at work and hit his head on the ground wit positive LOC. Pt does not have a previous seizure hx.   Tests Performed: head CT, labs. Abnormal Results:   Abnormal Labs Reviewed   CBC WITH PLATELETS DIFFERENTIAL - Abnormal; Notable for the following components:       Result Value    RBC Count 3.54 (*)     Hemoglobin 12.5 (*)     Hematocrit 38.2 (*)      (*)     MCH 35.3 (*)     Platelet Count 131 (*)     Absolute Lymphocytes 0.4 (*)     All other components within normal limits   COMPREHENSIVE METABOLIC PANEL - Abnormal; Notable for the following components:    Glucose 130 (*)     Creatinine 0.55 (*)     ALT 73 (*)     AST 82 (*)     All other components within normal limits   MAGNESIUM - Abnormal; Notable for the following components:    Magnesium 1.5 (*)     All other components within normal limits     CT Head w/o Contrast   Final Result   IMPRESSION: Normal head CT.                    STEPHANIE JUAREZ MD      .   Treatments  provided: normal saline, vitamin bag, valium  Family Comments: n/a  OBS brochure/video discussed/provided to patient:  N/A  ED Medications:   Medications   diazepam (VALIUM) tablet 10 mg (has no administration in time range)     Or   diazepam (VALIUM) injection 5-10 mg (has no administration in time range)   sodium chloride 0.9 % 1,000 mL with Infuvite Adult 10 mL, thiamine 100 mg, folic acid 1 mg infusion (has no administration in time range)   0.9% sodium chloride BOLUS (0 mLs Intravenous Stopped 4/19/20 1419)   diazepam (VALIUM) injection 5 mg (5 mg Intravenous Given 4/19/20 1333)   magnesium sulfate 2 g in water intermittent infusion (0 g Intravenous Stopped 4/19/20 1518)     Drips infusing:  Yes  For the majority of the shift, the patient's behavior Green. Interventions performed were n/a.    Sepsis treatment initiated: No       ED Nurse Name/Phone Number: Ciera Medina RN,   4:25 PM    RECEIVING UNIT ED HANDOFF REVIEW    Above ED Nurse Handoff Report was reviewed: Yes  Reviewed by: Sue Burger RN on April 19, 2020 at 5:03 PM

## 2020-04-19 NOTE — PHARMACY-ADMISSION MEDICATION HISTORY
Admission medication history interview status for this patient is complete. See [facility] admission navigator for allergy information, prior to admission medications and immunization status.     Medication history interview source(s):Patient via RN  Medication history resources (including written lists, pill bottles, clinic record):None    Changes made to PTA medication list:  Added: none  Deleted: Tylenol prn, SennaS prn    Prior to Admission medications    None

## 2020-04-20 NOTE — CONSULTS
Care Transition Initial Assessment - RN        Met with: Patient.  DATA   Active Problems:    Alcohol withdrawal (H)       Cognitive Status: awake and alert.  Primary Care Clinic Name: [facility](establishing care)     Contact information and PCP information verified: Yes  Lives With: alone       Insurance concerns: No Insurance issues identified  ASSESSMENT  Patient currently receives the following services:  Not asked        Identified issues/concerns regarding health management: RN CTS consulted for scheduling PCP appointment. Patient states is not currently established with a PCP or in a clinic. Patient prefers [facility] in [place].   Appointment scheduled and updated to AVS.   PLAN  Financial costs for the patient were not discussed .  Patient given options and choices for discharge Yes .  Patient/family is agreeable to the plan?  Yes.   Patient anticipates discharging to Home .        Patient anticipates needs for home equipment: No  Transportation/person available to transport on day of discharge  is TBD.   Plan/Disposition: Home   Appointments: Scheduled and updated to AVS.     Telephone Visit with Jesus Alberto Carrillo MD   Tuesday Apr 28, 2020 11:00 AM   [facility]    Care  (CTS) will continue to follow as needed.    Liyah Starks MA/RN Case Manager  Inpatient Care Coordination  [facility] [place]   [phone]

## 2020-04-20 NOTE — PLAN OF CARE
/70 (BP Location: Right arm)   Pulse 80   Temp 98.2  F (36.8  C) (Temporal)   Resp 16   SpO2 96%     Pt is A&O, but forgetful and restless. Denies pain, SOB, N/T, N/V, dizziness. LS diminished. Tele SR. Ax1 to SBA w/ GB. Viding well, 1 BM. BS+. CMS intact. CIWA 3, 4, 4 Tremors noted. Plan to discharge tomorrow morning per not having any Ativan overnight. MD aware and plans on discharging first thing in the morning.

## 2020-04-20 NOTE — PROGRESS NOTES
[facility]  Hospitalist Progress Note  Josey Tellez MD 04/20/20  Text Page  Pager: [phone] (7am-6pm)    Reason for Stay (Diagnosis): suspected alcohol related seizure         Assessment and Plan:      Summary of Stay: Luis Carlos Nash is a 49 year old male with past medical history of alcohol abuse with past treatment and tobacco use disorder who was admitted on 4/19/2020 after a reported seizure which was likely due to alcohol withdrawal.  Continues to have moderate confusion, will monitor today for further withdrawal/seizure.    Problem List/Assessment and Plan:     1.  Seizure-like activity concerning for alcohol withdrawal seizure: [place]  No prior history of alcohol withdrawal seizures.  He received 2 doses of Valium yesterday.  No further seizure activity.  Head CT showed no acute pathology.  This is likely due to his alcohol use/withdrawal.  Discussed the importance of cessation.    2.  Alcohol use disorder with alcohol dependence with alcohol withdrawal: Patient drinks daily.  He estimates he goes through 1 L of vodka per week.  He was in treatment 3 to 4 years ago.  Does not seem to think that his alcohol use is a big problem but does admit that he has recently increased the amount he is drinking.  -CIWA  -Vitamins  -Chemical dependency consult    3.  Mild confusion: Patient does appear to have some mild confusion.  This is likely due to his suspected alcohol withdrawal seizure and alcohol use.  Continue to monitor overnight for further evidence of significant withdrawal.    4.  Tobacco use disorder: Discussed cessation recommendation.    5.  Transaminitis: Mild and likely due to alcohol use.  Should have repeat testing once he has abstained from alcohol to ensure normalization.    Diet: Combination Diet Regular Diet Adult    DVT Prophylaxis: Low Risk/Ambulatory with no VTE prophylaxis indicated  Romy  Catheter: not present  Code Status: Full Code      Disposition Plan   Expected discharge: Tomorrow, recommended to prior living arrangement once no further seizures or withdrawal requiring treatment.  Entered: Josey Tellez MD 04/20/2020, 9:02 AM       The patient's care was discussed with the Bedside Nurse and Patient.    Hospitalist Service  [facility]          Interval History (Subjective):      Patient was seen with his bedside nurse this morning.  He states that he is feeling okay.  He denies nausea, vomiting, abdominal pain, chest pain or shortness of breath.  He does not feel tremulous at rest.  He states he did go to the bathroom this morning and felt okay walking but he did have someone standing next to him.  He reports [place]  He remembers going to work and then collapsing on the ground.  He denies prior history of seizures.  He does state that he was in treatment for alcohol abuse 3 to 4 years ago.  He thinks he has increased his amount of alcohol he is been drinking recently.    He does seem slightly confused.  He is scoring on CIWA but not high enough to require Ativan.                  Physical Exam:      Last Vital Signs:  /86 (BP Location: Right arm)   Pulse 80   Temp 99.3  F (37.4  C) (Temporal)   Resp 17   SpO2 95%     General: Alert, awake, no acute distress, does appear somewhat disheveled  HEENT: Normocephalic and atraumatic, eyes anicteric and without scleral injection, EOMI, face symmetric, MMM.  Cardiac: RRR, normal S1, S2. No m/g/r, no LE edema.  Pulmonary: Normal chest rise, normal work of breathing.  Lungs CTAB without crackles or wheezing.  Abdomen: soft, non-tender, non-distended.  Normoactive bowel sounds, no guarding or rebound tenderness.  Extremities: no deformities.  Warm, well perfused.  Skin: no rashes or lesions.  Warm and Dry.  Neuro: No focal deficits.  Speech clear.  Moving all extremities in bed.  Mild tremor  of hands.  Psych: Alert and oriented x3 but does appear slightly confused. Appropriate affect.         Medications:      All current medications were reviewed with changes reflected in problem list.         Data:      All new lab and imaging data was reviewed.   Labs:  Recent Labs   Lab 04/20/20  0643 04/19/20  1315   WBC 8.0 4.8   HGB 11.9* 12.5*   HCT 35.9* 38.2*   * 108*   * 131*     Recent Labs   Lab 04/20/20  0643 04/19/20  1315    135   POTASSIUM 3.8 4.0   CHLORIDE 101 99   CO2 26 30   ANIONGAP 6 6   GLC 83 130*   BUN 6* 9   CR 0.56* 0.55*   GFRESTIMATED >90 >90   GFRESTBLACK >90 >90   MELL 8.3* 8.9   MAG 1.7 1.5*   PROTTOTAL 7.0 7.9   ALBUMIN 3.1* 3.7   BILITOTAL 1.3 1.0   ALKPHOS 112 128   AST 72* 82*   ALT 58 73*      Imaging:   Recent Results (from the past 24 hour(s))   CT Head w/o Contrast    Narrative    CT OF THE HEAD WITHOUT CONTRAST April 19, 2020 2:10 PM     HISTORY: Head trauma, first time seizure.    TECHNIQUE: Axial CT images of the head from the skull base to the  vertex were acquired without IV contrast. Radiation dose for this scan  was reduced using automated exposure control, adjustment of the mA  and/or kV according to patient size, or iterative reconstruction  technique.    COMPARISON: Head CT 10/27/2017.    FINDINGS: The ventricles and basal cisterns are within normal limits  in configuration. There is no midline shift. There are no extra-axial  fluid collections.  Gray-white differentiation is well maintained.     No intracranial hemorrhage, mass or recent infarct.    The visualized paranasal sinuses are well-aerated. There is no  mastoiditis. There are no fractures of the visualized bones.      Impression    IMPRESSION: Normal head CT.              MD Josey HENDRICKSON MD

## 2020-04-20 NOTE — PLAN OF CARE
Pt admitted to room 624 from ED at 1730.  A&O with some forgetfulness.  Vitals monitored.  Denies pain, though groans when taking vitals or doing other cares.  Up with Ax1, gb and IV pole for stability.  Voiding adequate amounts.  IV infusing LR at 100/hr.  On remote tele.  CIWA scores 5, 5 for tremors.  Regular diet.  Seizure pads in place.  Will monitor.

## 2020-04-20 NOTE — PLAN OF CARE
Pt alert and oriented however forgetful at times. Pain rating 4/10 primarily around L rib cage. No bruising to be seen. Heat and cold applications applied simultaneously. LS diminished and wheezes noted. Encouraged CADB exercises and ambulation. BS present, passing flatus. LBM 4/19/2020. CMS intact. Tremors noted but CIWA scores were 6 and 3 . Remote tele monitoring. Labs to be drawn in AM (Mg r/p'd in ED).

## 2020-04-21 NOTE — PLAN OF CARE
DX: Seizure ETOH  Tele: pt removed, was SR  A&O x2 disoriented to situation and place  Activity: SBA  Diet: Reg  VSS  O2: RA  BG: na  PIV: pt removed and refused a new iv  Pain: denies  GI/: continent  Plan: if pt tries to leave notify md and will place CAROLINA  Discharge: TBD  Pt is refusing any medications and is restless and paranoid with confusion.  Refused a new iv to be placed    Pt has a nicotine patch on right shoulder, pt also has been wanting to go have a smoke.

## 2020-04-21 NOTE — PROVIDER NOTIFICATION
0000- [phone] TW pt is refusing iv he is more anxious and disoriented tried to give him valium pt will not take.  What do you advise?    [phone] TW pt was up wanting to go have a cigarette is more confused, security came should we put him on a CAROLINA?

## 2020-04-21 NOTE — PROGRESS NOTES
Patient refuses IV, agitated and refusing to cooperate.  Paranoid and confused to time place and date.  Patient refused oral medication also.

## 2020-04-21 NOTE — DISCHARGE SUMMARY
[facility]  Discharge Summary  Name: Luis Carlos Nash    MRN: [id number]  YOB: 1970    Age: 49 year old  Date of Discharge:  4/21/2020  7:38 AM  Date of Admission: 4/19/2020  Primary Care Provider: [facility]  Discharge Physician:  Josey Tellez MD  Discharging Service:  Hospitalist      Discharge Diagnoses:  1.  Seizure-like activity concerning for alcohol withdrawal seizure  2.  Alcohol use disorder with alcohol dependence and alcohol withdrawal  3.  Mild confusion  4.  Tobacco use disorder  5.  Transaminitis     Follow-ups Needed After Discharge   Follow up with PCP within 2 weeks    Unresulted Labs Ordered in the Past 30 Days of this Admission     No orders found from 10/16/2018 to 12/16/2018.        Hospital Course:  Luis Carlos Nash is a 49 year old male with past medical history of alcohol abuse with past treatment and tobacco use disorder who was admitted on 4/19/2020 after a reported seizure which was likely due to alcohol withdrawal.  Patient was monitored for further seizures or withdrawal.  Did not require further treatment with benzodiazepines.  He was somewhat agitated and confused but on morning of discharge could answer questions appropriately and was safe for discharge.     1.  Seizure-like activity concerning for alcohol withdrawal seizure: [place]  No prior history of alcohol withdrawal seizures.  He received 2 doses of Valium on the day of admission.  No further seizure activity.  Head CT showed no acute pathology.  This is likely due to his alcohol use/withdrawal.  Discussed the importance of cessation.     2.  Alcohol use disorder with alcohol dependence with alcohol withdrawal: Patient drinks daily.  He estimates he goes through 1 L of vodka per week.  He was in treatment 3 to 4 years ago.  Does not seem to think that his alcohol use is a big problem but does admit that  he has recently increased the amount he is drinking.  SW did see patient and give him resources. Discussed cessation with the patient as well.  He did not require treatment with benzodiazepines for 24 hours prior to discharge.  He was somewhat agitated and confused overnight prior to discharge but on morning of discharge he was oriented x3, could tell me what brought him to the hospital, where he lived/worked etc.  He has a significant alcohol use problem but at this time is safe for discharge and not holdable.  Discussed cessation as above.     3.  Mild confusion: Patient does appear to have some mild confusion.  This is likely due to his suspected alcohol withdrawal seizure and alcohol use.  As above he was agitated overnight because he wanted to leave and smoke.  On the morning of discharge he was oriented x3 and could have an appropriate conversation with me.  I do suspect that there is some cognitive impairment from his chronic alcohol use.     4.  Tobacco use disorder: Discussed cessation recommendation.     5.  Transaminitis: Mild and likely due to alcohol use.  Should have repeat testing once he has abstained from alcohol to ensure normalization.        Discharge Disposition:  Discharged to home     Allergies:  No Known Allergies     Condition on Discharge:  Discharge condition: Stable   Discharge vitals: Blood pressure 135/86, pulse 82, temperature 98.4  F (36.9  C), temperature source Temporal, resp. rate 18, SpO2 100 %.   Code status on discharge: Full Code     History of Illness:  See detailed admission note for full details.    Physical Exam:  Blood pressure 135/86, pulse 82, temperature 98.4  F (36.9  C), temperature source Temporal, resp. rate 18, SpO2 100 %.  Wt Readings from Last 1 Encounters:   10/27/17 73.9 kg (162 lb 14.4 oz)     General: Alert, awake, no acute distress.  HEENT: Normocephalic, atraumatic, eyes anicteric and without scleral injection, EOMI, MMM.  Cardiac: RRR, normal S1, S2.  No  m/g/r. No LE edema.  Pulmonary: Normal chest rise, normal work of breathing.  Lungs CTAB without crackles or wheezing  Abdomen: soft, non-tender, non-distended.  Normoactive BS.  No guarding or rebound tenderness.  Extremities: no deformities.  Warm, well perfused.  Skin: no rashes or lesions noted.  Warm and Dry.  Neuro: No focal deficits noted.  Speech clear.  Coordination and strength grossly normal. Ambulated independently without difficulty. No current tremor  Psych: Appropriate affect. Alert and oriented x3 although dose have poor insight into his drinking    Procedures other than Imaging:  Phlebotomy     Imaging:  Results for orders placed or performed during the hospital encounter of 04/19/20   CT Head w/o Contrast    Narrative    CT OF THE HEAD WITHOUT CONTRAST April 19, 2020 2:10 PM     HISTORY: Head trauma, first time seizure.    TECHNIQUE: Axial CT images of the head from the skull base to the  vertex were acquired without IV contrast. Radiation dose for this scan  was reduced using automated exposure control, adjustment of the mA  and/or kV according to patient size, or iterative reconstruction  technique.    COMPARISON: Head CT 10/27/2017.    FINDINGS: The ventricles and basal cisterns are within normal limits  in configuration. There is no midline shift. There are no extra-axial  fluid collections.  Gray-white differentiation is well maintained.     No intracranial hemorrhage, mass or recent infarct.    The visualized paranasal sinuses are well-aerated. There is no  mastoiditis. There are no fractures of the visualized bones.      Impression    IMPRESSION: Normal head CT.              STEPHANIE JUAREZ MD        Consultations:  Consultations This Hospital Stay   CARE TRANSITION RN/SW IP CONSULT  SMOKING CESSATION PROGRAM IP CONSULT     Recent Lab Results:  Recent Labs   Lab 04/20/20  0643 04/19/20  1315   WBC 8.0 4.8   HGB 11.9* 12.5*   HCT 35.9* 38.2*   * 108*   * 131*     Recent Labs    Lab 04/20/20  0643 04/19/20  1315    135   POTASSIUM 3.8 4.0   CHLORIDE 101 99   CO2 26 30   ANIONGAP 6 6   GLC 83 130*   BUN 6* 9   CR 0.56* 0.55*   GFRESTIMATED >90 >90   GFRESTBLACK >90 >90   MELL 8.3* 8.9   MAG 1.7 1.5*   PROTTOTAL 7.0 7.9   ALBUMIN 3.1* 3.7   BILITOTAL 1.3 1.0   ALKPHOS 112 128   AST 72* 82*   ALT 58 73*          Pending Results:    Unresulted Labs Ordered in the Past 30 Days of this Admission     No orders found from 3/20/2020 to 4/20/2020.           Discharge Instructions and Follow-Up:   Discharge Orders      Reason for your hospital stay    You were hospitalized after having a possible alcohol withdrawal seizure.  You were monitored for further significant withdrawal symptoms.  It is extremely important that you avoid alcohol use.  Continuing to drink can lead to further seizures, liver damage and brain damage.     Follow-up and recommended labs and tests     Follow up with primary care as already scheduled.     Activity    Your activity upon discharge: activity as tolerated     Full Code     Diet    Follow this diet upon discharge: Orders Placed This Encounter      Combination Diet Regular Diet Adult. No alcohol.     Discharge Medications   There are no discharge medications for this patient.      Time Spent on this Encounter   I, Josey Tellez MD, personally saw the patient today and spent greater than 30 minutes discharging this patient.    Josey Tellez MD

## 2020-04-21 NOTE — PROVIDER NOTIFICATION
[id number] TW pt is a smoker can we get orders for a nicotine patch?    [phone] TW pt removed iv access he is possible discharging in am is it ok to leave without IV access as he took out thinking he is leaving.  Also prior page for nicotine patch?

## 2020-04-28 NOTE — PROGRESS NOTES
"Luis Carlos Nash is a 49 year old male who is being evaluated via a billable telephone visit.      The patient has been notified of following:     \"This telephone visit will be conducted via a call between you and your physician/provider. We have found that certain health care needs can be provided without the need for a physical exam.  This service lets us provide the care you need with a short phone conversation.  If a prescription is necessary we can send it directly to your pharmacy.  If lab work is needed we can place an order for that and you can then stop by our lab to have the test done at a later time.    Telephone visits are billed at different rates depending on your insurance coverage. During this emergency period, for some insurers they may be billed the same as an in-person visit.  Please reach out to your insurance provider with any questions.    If during the course of the call the physician/provider feels a telephone visit is not appropriate, you will not be charged for this service.\"    Patient has given verbal consent for Telephone visit?  Yes        Subjective     Luis Carlos Nash is a 49 year old male who presents to clinic today for the following health issues:    [facility]    Hospital Follow-up Visit:    Hospital/Nursing Home/IP Rehab Facility: [facility]  Date of Admission: 04/19/20  Date of Discharge: 04/21/20  Reason(s) for Admission: Alcohol withdrawal seizure, alcohol use, loss of consciousness      Was your hospitalization related to COVID-19? No   Problems taking medications regularly:  None  Medication changes since discharge: None  Problems adhering to non-medication therapy:  None    Summary of hospitalization:  [facility] discharge summary reviewed  Diagnostic Tests/Treatments reviewed.  Follow up needed: none  Other Healthcare Providers Involved in Patient s Care:         None  Update since discharge: improved. Post Discharge Medication Reconciliation: " "discharge medications reconciled, continue medications without change.  Plan of care communicated with patient          Recently hospitalized for seizure , related to alcohol withdrawal.   Has h/o alcohol abuse, had a clonic tonic seizure at work. No prior h/o seizures.   Had evidence of alcoholic related transaminitis by lab work.   Had brain CT - negative.   Mental status improved on discharge. No delirium.   [place]   No current fever , chills, nausea, vomiting, abdominal pain.   Still drinks alcohol but reports decreased amounts.   Needs follow up lab for his liver tests.          Patient Active Problem List   Diagnosis     Pneumonia     Alcohol withdrawal (H)     History reviewed. No pertinent surgical history.    Social History     Tobacco Use     Smoking status: Current Every Day Smoker     Packs/day: 1.00     Years: 20.00     Pack years: 20.00     Types: Cigarettes     Smokeless tobacco: Never Used   Substance Use Topics     Alcohol use: Yes     Comment: \"every few days I have 2 glasses of hard liquor\"     History reviewed. No pertinent family history.      No current outpatient medications on file.       Reviewed and updated as needed this visit by Provider         Review of Systems   ROS COMP: Constitutional, HEENT, cardiovascular, pulmonary, gi and gu systems are negative, except as otherwise noted.       Objective   Normal speech and affect  Remainder of exam unable to be completed due to telephone visits    Diagnostic Test Results:  Labs reviewed in Epic        Assessment/Plan:  1. Hospital discharge follow-up      2. Alcohol abuse      3. Alcohol withdrawal seizure without complication (H)        Clinically improved post hospital stay  No mental status changes, no recurrent seizures   Advised alcohol cessation   Needs repeated LFT and liver UC, will order   Needs release to work letter, no symptoms of infection, no recurrent seizures, no contraindications to work "   Social work consult to help with ETOH abuse resources and needs transportation help     Phone call duration:  14  minutes    Jesus Alberto Carrillo MD

## 2020-04-28 NOTE — LETTER
[facility]  [address]  [facility] [address]  [phone]          April 28, 2020    RE:  Luis Carlos Nash                                                                                                                                                       [address]   [facility] [address]            To whom it may concern:    Luis Carlos Nash is under my professional care.  The patient was recently hospitalized for a seizure.   He is discharged and no recurrent seizures are observed.   He can return to work.       Sincerely,        Jesus Alberto Carrillo MD

## 2020-05-04 NOTE — ED PROVIDER NOTES
"  History     Chief Complaint:    Chest Pain and Cough    The history is provided by the patient.      Luis Carlos Nahs is a 49 year old male with a history of alcohol dependence, alcohol withdrawal, and one previous seizure on 4/19/2020 who presents with left-sided chest pain and a cough. The patient was having an outpatient abdomen ultrasound done today when he \"slumped over.\" He remembers being at imaging and is able to recall his morning before then as well.  He does not feel that he passed out.  He notes that he has chest pain from a fall a few weeks ago but no shortness of breath or significant cough.  He denies leg pain, fevers, confusion, or loss of bladder control.  He was not confused immediately after the episode and remembers all events.  The patient denies any known sick contacts. He states that his most recent alcoholic drink was yesterday. The patient states that he fell 1.5 weeks ago in the bathtub and hit his head; he has not been worked up for this yet.  With this fall, he also had left-sided pain.  Of note: the patient was discharged on 4/21/2020 after a 2-day hospital stay following a suspected alcohol withdrawal seizure. This was his first ever seizure and he bit his tongue then, but did not bite it today. No urinary incontinence with today's episode.  Patient been offered alcohol treatment programs which he declined.    Allergies:  No Known Drug Allergies     Medications:    The patient is currently on no regular medications.    Past Medical History:    Alcohol withdrawal  Pneumonia   Chronic venous hypertension with ulceration  Varicose vein of leg  Stasis dermatitis  Habitual alcohol use  Alcohol dependence  Skin lesion  Venous insufficiency  Leg ulcer    Past Surgical History:    The patient does not have any pertinent past surgical history.    Family History:    Type 2 diabetes - brother    Social History:  Positive for tobacco use - 1.00 packs/day.  Positive for alcohol use - \"every few " "days I have 2 glasses of hard liquor.\"  Negative for drug use - not currently.   Marital Status:  Single [1]    Review of Systems   Constitutional: Negative for fever.   Respiratory: Positive for cough and shortness of breath.    Cardiovascular: Positive for chest pain.   Genitourinary:        Denies urine incontinence   Musculoskeletal:        Denies leg pain   Neurological: Positive for seizures.   Psychiatric/Behavioral: Negative for confusion.   All other systems reviewed and are negative.    Physical Exam     Patient Vitals for the past 24 hrs:   BP Temp Temp src Pulse Heart Rate Resp SpO2   05/04/20 1425 -- -- -- -- 93 18 93 %   05/04/20 1415 133/84 -- -- 86 85 15 93 %   05/04/20 1345 -- -- -- -- 92 17 94 %   05/04/20 1330 -- -- -- -- 88 20 95 %   05/04/20 1315 -- -- -- -- 71 14 95 %   05/04/20 1215 137/83 -- -- 79 78 16 98 %   05/04/20 1200 (!) 142/109 -- -- 109 105 30 93 %   05/04/20 1154 (!) 143/89 98.4  F (36.9  C) Oral 100 -- 16 100 %     Physical Exam  General: Resting on the bed.  Mildly disheveled. Falls asleep easily without any staff in the room.   Skin: Good turgor, no rash, no unusual bruising or prominent lesions.  HEENT: Head: Normocephalic, atraumatic, no visible masses.   Eyes: Conjunctiva clear.  Throat: No tongue laceration.  No tongue fasciculations.  Cardiac: Normal rate and regular rhythm, no murmur or gallop.   Lungs: Clear to auscultation.  Abdomen: Abdomen soft, non-tender. No rebound tenderness of guarding.   Musculoskeletal: Normal gait and station. No calf tenderness or swelling.  No cervical spine tenderness.  Full range of motion neck without difficulty.  Tenderness palpation through the mid to lower left lateral ribs.  Neurologic: Oriented x 3. GCS: 15.  Mild resting tremor.  No pronator drift.  Psychiatric: Intact recent and remote memory.  Disheveled appearance.  Admits to daily alcohol use.    Emergency Department Course     ECG (12:36:55):  Rate 76 bpm. NE interval 140. QRS " duration 78. QT/QTc 398/447. P-R-T axes 78 62 70. Sinus rhythm. Normal ECG. When compared with ECG of 19-APR-2020 15:13,  T wave inversion no longer evident in Anterior leads, QT has shortened. Interpreted at 1241 by Nicola Wolfe MD.    Imaging:  Radiology findings were communicated with the patient who voiced understanding of the findings.    XR  Ribs, unilat 3 views + PA chest, left:   Left fourth through eighth rib lateral nondisplaced acute-appearing fractures. There is fracture deformity related to multiple old right rib fractures. There may be emphysematous changes at the lung apices. The lungs appear otherwise grossly clear. No pleural effusion or pneumothorax is demonstrated, as per radiology.     CT Head w/o IV contrast:   No evidence of acute intracranial hemorrhage, mass, or herniation, as per radiology.    Laboratory:  Laboratory findings were communicated with the patient who voiced understanding of the findings.    CBC: HGB 12.6 L o/w WNL (WBC 7.7, )  CMP: Creatinine 0.52 L, AST 48 H o/w WNL  Lipase: 182  Troponin I (1310): <0.015  Alcohol level blood: 0.12 H    Interventions:  1213: 0.9% sodium chloride BOLUS 1 L IV    Emergency Department Course:  Past medical records, nursing notes, and vitals reviewed.    1159: I performed an exam of the patient as documented above.     EKG obtained in the ED, see results above.     IV was inserted and blood was drawn for laboratory testing, results above.    The patient was sent for a head CT and ribs x-ray while in the emergency department, results above.     1402: I rechecked the patient and discussed the results of his workup thus far.     I personally reviewed the laboratory, EKG, and imaging results with the Patient and answered all related questions prior to discharge.     Findings and plan explained to the Patient. Patient discharged home with instructions regarding supportive care, medications, and reasons to return. The importance of close  follow-up was reviewed.     Impression & Plan     Medical Decision Making:  Luis Carlos Nash is a 49 year old male who presents the ED today for evaluation of left-sided rib pain and an episode of possible syncope while getting an outpatient ultrasound earlier today.  Details of the patient's history can be noted in the HPI.  Differentials considered include syncope, alcohol intoxication, withdrawal seizure, arrhythmia, ACS, PE, dissection, coronavirus, amongst others.  On my exam, patient appeared improved from his prior presentation as I was the provider that admitted him with his alcohol withdrawal seizure a few weeks ago.  He did have reproducible left-sided chest pain.  There is no obvious tongue fasciculations or resting tremor.  He was alert and oriented.  Basic labs obtained, showing no electrolyte disturbances.  Troponin within normal limits.  ECG shows no ischemia or arrhythmia.  Alcohol level was elevated at 0.12.  Given his falls, we did obtain a head CT which showed no new findings or intracranial bleed.  Chest x-ray indicated rib fractures 4 through 8 on the left side.    At this point, unclear cause the patient's episode this morning.  I do suspect that due to alcohol intoxication he may have fell in the sleep as he was rather lethargic here.  It does not sound like a seizure today, he was not postictal, no tongue biting or urinary incontinence. He does not appear to be in withdrawal today. We discussed that the episode and his falls are due to his alcohol use.  I again offered him detox and treatment resources.  He declined.  He was discharged with an incentive spirometer.  He feels the pain can be controlled at home with Tylenol and ibuprofen.  I am in agreement with this as I would prefer not to add narcotics to his daily alcohol use at home.  No signs of respiratory compromise given the multiple rib fracture, I do not feel he warrants more advanced imaging studies at this time.  Additionally,  fractures happened over a week ago, without signs of worsening respiratory symptoms at home.  He will follow-up closely with his primary care provider, return for changing worsening symptoms, passing out at home, additional falls, chest pain, shortness of breath, additional seizures, new concerns.  All questions were answered prior to discharge.  He was in agreement with the treatment plan as stated above.    Diagnosis:    ICD-10-CM    1. Rib fractures  S22.39XA    2. Alcohol intoxication (H)  F10.929    3. Fall, initial encounter  W19.XXXA    4. Atypical chest pain  R07.89      Disposition:  Discharged to home.    Discharge Medications:  There are no discharge medications for this patient.    Scribe Disclosure:  I, Sally Nichole, am serving as a scribe at 11:52 AM on 5/4/2020 to document services personally performed by Madonna Richardson PA based on my observations and the provider's statements to me.     Sally Nichole  5/4/2020   [facility] EMERGENCY DEPARTMENT    This was created at least in part with a voice recognition software. Mistakes/typos may be present.        Madonna Richardson PA  05/04/20 9890

## 2020-05-04 NOTE — ED NOTES
A/O. VSS. PIV removed. Pt verbalized understanding of d/c instructions, writer not confident pt will makes changes and stop drinking alcohol. Risk factors, seizures, and possibility of death from ETOH explained to pt.

## 2020-05-04 NOTE — ED TRIAGE NOTES
Patient found slumped over in wheelchair at front of hospital had outpatient ultrasound today. Possible + ETOH. Patient complains of left sided chest pain and cough. ABC intact alert and no distress.

## 2020-05-04 NOTE — ED AVS SNAPSHOT
[facility] Emergency Department  [address]  [facility] [address]  Phone:  [phone]  Fax:  [phone]                                    Luis Carlos Nash   MRN: [id number]    Department:  [facility] Emergency Department   Date of Visit:  5/4/2020           After Visit Summary Signature Page    I have received my discharge instructions, and my questions have been answered. I have discussed any challenges I see with this plan with the nurse or doctor.    ..........................................................................................................................................  Patient/Patient Representative Signature      ..........................................................................................................................................  Patient Representative Print Name and Relationship to Patient    ..................................................               ................................................  Date                                   Time    ..........................................................................................................................................  Reviewed by Signature/Title    ...................................................              ..............................................  Date                                               Time          22EPIC Rev 08/18

## 2020-05-04 NOTE — DISCHARGE INSTRUCTIONS
Your pain is due to rib fractures.  I suspect that your falls and your episode this morning are due to your alcohol use.  I strongly encourage you to decrease your alcohol use or seek an outpatient treatment program.  Continue Tylenol and ibuprofen at home for pain control with the rib fractures.  Make sure you are doing the incentive spirometer to help fully inflate and deflate your lungs.  Return for additional falls, difficulty breathing, new concerns.

## 2020-05-14 NOTE — TELEPHONE ENCOUNTER
Mailed letter from Virtual Visit to pt, per the hospital staff, Jennifer. Pt is still waiting for this to go back to work.     Attempted to contact pt. Left message that mailing letter again. But was probably mailed out after his Virtual visit on 4/28.